# Patient Record
Sex: FEMALE | Race: BLACK OR AFRICAN AMERICAN | NOT HISPANIC OR LATINO | Employment: FULL TIME | URBAN - METROPOLITAN AREA
[De-identification: names, ages, dates, MRNs, and addresses within clinical notes are randomized per-mention and may not be internally consistent; named-entity substitution may affect disease eponyms.]

---

## 2017-06-05 ENCOUNTER — OFFICE VISIT (OUTPATIENT)
Dept: URGENT CARE | Facility: PHYSICIAN GROUP | Age: 42
End: 2017-06-05
Payer: COMMERCIAL

## 2017-06-05 VITALS
DIASTOLIC BLOOD PRESSURE: 100 MMHG | HEART RATE: 98 BPM | SYSTOLIC BLOOD PRESSURE: 142 MMHG | OXYGEN SATURATION: 98 % | RESPIRATION RATE: 20 BRPM | WEIGHT: 190 LBS | TEMPERATURE: 99.1 F

## 2017-06-05 DIAGNOSIS — R21 RASH AND NONSPECIFIC SKIN ERUPTION: ICD-10-CM

## 2017-06-05 DIAGNOSIS — R03.0 ELEVATED BLOOD PRESSURE READING: ICD-10-CM

## 2017-06-05 DIAGNOSIS — L53.9 ERYTHRODERMA: ICD-10-CM

## 2017-06-05 PROCEDURE — 99203 OFFICE O/P NEW LOW 30 MIN: CPT | Performed by: FAMILY MEDICINE

## 2017-06-05 RX ORDER — DEXAMETHASONE SODIUM PHOSPHATE 4 MG/ML
8 INJECTION, SOLUTION INTRA-ARTICULAR; INTRALESIONAL; INTRAMUSCULAR; INTRAVENOUS; SOFT TISSUE ONCE
Status: COMPLETED | OUTPATIENT
Start: 2017-06-05 | End: 2017-06-05

## 2017-06-05 RX ORDER — CEPHALEXIN 500 MG/1
500 CAPSULE ORAL 3 TIMES DAILY
Qty: 21 CAP | Refills: 0 | Status: SHIPPED | OUTPATIENT
Start: 2017-06-05 | End: 2017-06-12

## 2017-06-05 RX ORDER — FLUTICASONE PROPIONATE 50 MCG
1 SPRAY, SUSPENSION (ML) NASAL DAILY
COMMUNITY

## 2017-06-05 RX ORDER — PREDNISONE 20 MG/1
TABLET ORAL
Qty: 21 TAB | Refills: 0 | Status: SHIPPED | OUTPATIENT
Start: 2017-06-05 | End: 2019-06-30 | Stop reason: SDUPTHER

## 2017-06-05 RX ORDER — MONTELUKAST SODIUM 10 MG/1
10 TABLET ORAL DAILY
COMMUNITY
End: 2020-02-07

## 2017-06-05 RX ORDER — CLONIDINE HYDROCHLORIDE 0.1 MG/1
0.1 TABLET ORAL 2 TIMES DAILY
Qty: 60 TAB | Refills: 1 | Status: SHIPPED | OUTPATIENT
Start: 2017-06-05 | End: 2020-02-07

## 2017-06-05 RX ORDER — TRIAMCINOLONE ACETONIDE 1 MG/G
OINTMENT TOPICAL
Qty: 455 G | Refills: 0 | Status: SHIPPED | OUTPATIENT
Start: 2017-06-05 | End: 2020-02-07

## 2017-06-05 RX ADMIN — DEXAMETHASONE SODIUM PHOSPHATE 8 MG: 4 INJECTION, SOLUTION INTRA-ARTICULAR; INTRALESIONAL; INTRAMUSCULAR; INTRAVENOUS; SOFT TISSUE at 09:58

## 2017-06-05 ASSESSMENT — ENCOUNTER SYMPTOMS
DIZZINESS: 0
FEVER: 0
CHILLS: 0
FOCAL WEAKNESS: 0

## 2017-06-05 NOTE — PROGRESS NOTES
Subjective:      Shaunna Hines is a 42 y.o. female who presents with Rash    Chief Complaint   Patient presents with   • Rash     itchy, burning rash over body x3 weeks, worsening        - This is a very pleasant 42 y.o. female with complaints of rash itching on/off x 3 wks. Nothing new she can think of. It is all over.     - No hx HTN, says BP normally runs 120's/70's. She is currently very anxious/tearful due to severe itching/rash all over her body and attributes this to her BP elevation.     - new to area, no pcp.           ALLERGIES:  Review of patient's allergies indicates not on file.     PMH:  No past medical history on file.     MEDS:    Current outpatient prescriptions:   •  fluticasone (FLONASE) 50 MCG/ACT nasal spray, Spray 1 Spray in nose every day., Disp: , Rfl:   •  montelukast (SINGULAIR) 10 MG Tab, Take 10 mg by mouth every day., Disp: , Rfl:   •  predniSONE (DELTASONE) 20 MG Tab, 1 tab TID x 3 days then 1 tab BID x 4 days then 1 tab QD x 4 days, Disp: 21 Tab, Rfl: 0  •  triamcinolone acetonide (KENALOG) 0.1 % Ointment, Apply BID to neck and upper chest x 1-2 weeks, Disp: 455 g, Rfl: 0  •  clonidine (CATAPRES) 0.1 MG Tab, Take 1 Tab by mouth 2 times a day., Disp: 60 Tab, Rfl: 1    Current facility-administered medications:   •  dexamethasone (DECADRON) injection 8 mg, 8 mg, Intramuscular, Once, Usama Britton M.D.    ** I have documented what I find to be significant in regards to past medical, social, family and surgical history  in my HPI or under PMH/PSH/FH review section, otherwise it is contributory **             HPI    Review of Systems   Constitutional: Negative for fever and chills.   Skin: Positive for itching and rash.   Neurological: Negative for dizziness and focal weakness.          Objective:     /100 mmHg  Pulse 98  Temp(Src) 37.3 °C (99.1 °F)  Resp 20  Wt 86.183 kg (190 lb)  SpO2 98%     Physical Exam   Constitutional: She appears well-developed. No distress.   HENT:    Head: Normocephalic and atraumatic.   Eyes: Conjunctivae are normal.   Neck: Neck supple.   Cardiovascular: Regular rhythm.    No murmur heard.  Pulmonary/Chest: Effort normal. No respiratory distress.   Neurological: She is alert. She exhibits normal muscle tone.   Skin: Skin is warm and dry.   Psychiatric: She has a normal mood and affect. Judgment normal.   Nursing note and vitals reviewed.  dry atopic like rash over upper body/neck/face and upper arms mainly. Back of neck has some moist eczematous areas.             Assessment/Plan:         1. Rash and nonspecific skin eruption  triamcinolone acetonide (KENALOG) 0.1 % Ointment    REFERRAL TO FAMILY PRACTICE    dexamethasone (DECADRON) injection 8 mg   2. Erythroderma  REFERRAL TO DERMATOLOGY    predniSONE (DELTASONE) 20 MG Tab    REFERRAL TO FAMILY PRACTICE    dexamethasone (DECADRON) injection 8 mg   3. Elevated blood pressure reading  clonidine (CATAPRES) 0.1 MG Tab    REFERRAL TO FAMILY PRACTICE       Monitor BP closely at home over next week, if BP not improving rtc or w/ pcp       Dx & d/c instructions discussed w/ patient and/or family members. Follow up w/ Prvt Dr or here in 3-4 days if not getting better, sooner if needed,  ER if worse and UC/PCP unavailable.        Possible side effects (i.e. Rash, GI upset/constipation, sedation, elevation of BP or sugars) of any medications given discussed.

## 2017-06-05 NOTE — Clinical Note
June 5, 2017       To Whom it May Concern:    Shaunna Hines was seen in my clinic on 6/5/2017. She may return to work on 6/6/17..    If you have any questions or concerns, please don't hesitate to call.        Sincerely,           Usama Britton M.D.  Electronically Signed

## 2017-06-05 NOTE — MR AVS SNAPSHOT
Shaunna Hines   2017 9:15 AM   Office Visit   MRN: [id number]    Department:  [facility]   Dept Phone:  [phone]    Description:  Female : 1975   Provider:  Usama Britton M.D.           Reason for Visit     Rash itchy, burning rash over body x3 weeks, worsening      Allergies as of 2017     Allergen Noted Reactions    Bactrim [Sulfamethoxazole W-Trimethoprim] 2017       Doxycycline 2017       Nsaids 2017         You were diagnosed with     Rash and nonspecific skin eruption   [303688]       Erythroderma   [[id number]]       Elevated blood pressure reading   [[id number]]         Vital Signs     Blood Pressure Pulse Temperature Respirations Weight Oxygen Saturation    142/100 mmHg 98 37.3 °C (99.1 °F) 20 86.183 kg (190 lb) 98%    Smoking Status                   Unknown If Ever Smoked           Basic Information     Date Of Birth Sex Race Ethnicity Preferred Language    1975 Female Black or  Non- English      Health Maintenance        Date Due Completion Dates    IMM DTaP/Tdap/Td Vaccine (1 - Tdap) 3/21/1995 ---    PAP SMEAR 3/21/1997 ---    MAMMOGRAM 3/21/2016 ---            Current Immunizations     No immunizations on file.      Below and/or attached are the medications your provider expects you to take. Review all of your home medications and newly ordered medications with your provider and/or pharmacist. Follow medication instructions as directed by your provider and/or pharmacist. Please keep your medication list with you and share with your provider. Update the information when medications are discontinued, doses are changed, or new medications (including over-the-counter products) are added; and carry medication information at all times in the event of emergency situations     Allergies:  BACTRIM - (reactions not documented)     DOXYCYCLINE - (reactions not documented)     NSAIDS - (reactions not documented)               Medications   Valid as of: June 05, 2017 - 10:20 AM    Generic Name Brand Name Tablet Size Instructions for use    CloNIDine HCl (Tab) CATAPRES 0.1 MG Take 1 Tab by mouth 2 times a day.        Fluticasone Propionate (Suspension) FLONASE 50 MCG/ACT Spray 1 Spray in nose every day.        Montelukast Sodium (Tab) SINGULAIR 10 MG Take 10 mg by mouth every day.        PredniSONE (Tab) DELTASONE 20 MG 1 tab TID x 3 days then 1 tab BID x 4 days then 1 tab QD x 4 days        Triamcinolone Acetonide (Ointment) KENALOG 0.1 % Apply BID to neck and upper chest x 1-2 weeks        .                 Medicines prescribed today were sent to:     [facility]Y #[id number] - [place], [place] - [address] [place] [address]    Phone: [phone] Fax: [phone]    Open 24 Hours?: No      Medication refill instructions:       If your prescription bottle indicates you have medication refills left, it is not necessary to call your provider’s office. Please contact your pharmacy and they will refill your medication.    If your prescription bottle indicates you do not have any refills left, you may request refills at any time through one of the following ways: The online Social DJ system (except Urgent Care), by calling your provider’s office, or by asking your pharmacy to contact your provider’s office with a refill request. Medication refills are processed only during regular business hours and may not be available until the next business day. Your provider may request additional information or to have a follow-up visit with you prior to refilling your medication.   *Please Note: Medication refills are assigned a new Rx number when refilled electronically. Your pharmacy may indicate that no refills were authorized even though a new prescription for the same medication is available at the pharmacy. Please request the medicine by name with the pharmacy before contacting your provider for a refill.           Referral     A referral request has been sent to our patient care coordination department. Please allow 3-5 business days for us to process this request and contact you either by phone or mail. If you do not hear from us by the 5th business day, please call us at [phone].           ActuatedMedical Access Code: [email or web address]  Expires: 7/5/2017 10:20 AM    ActuatedMedical  A secure, online tool to manage your health information     [employer]’s ActuatedMedical® is a secure, online tool that connects you to your personalized health information from the privacy of your home -- day or night - making it very easy for you to manage your healthcare. Once the activation process is completed, you can even access your medical information using the ActuatedMedical grecia, which is available for free in the Apple Grecia store or Google Play store.     ActuatedMedical provides the following levels of access (as shown below):   My Chart Features   [facility] Primary Care Doctor [facility]  Specialists [facility]  Urgent  Care Non-[facility]  Primary Care  Doctor   Email your healthcare team securely and privately 24/7 X X X    Manage appointments: schedule your next appointment; view details of past/upcoming appointments X      Request prescription refills. X      View recent personal medical records, including lab and immunizations X X X X   View health record, including health history, allergies, medications X X X X   Read reports about your outpatient visits, procedures, consult and ER notes X X X X   See your discharge summary, which is a recap of your hospital and/or ER visit that includes your diagnosis, lab results, and care plan. X X       How to register for ActuatedMedical:  1. Go to  [email or web address].  2. Click on the Sign Up Now box, which takes you to the New Member Sign Up page. You will need to provide the following information:  a. Enter your ActuatedMedical Access Code exactly as it appears at the top of this page. (You will not need to use this code after you’ve  completed the sign-up process. If you do not sign up before the expiration date, you must request a new code.)   b. Enter your date of birth.   c. Enter your home email address.   d. Click Submit, and follow the next screen’s instructions.  3. Create a Issuut ID. This will be your Issuut login ID and cannot be changed, so think of one that is secure and easy to remember.  4. Create a [employer] password. You can change your password at any time.  5. Enter your Password Reset Question and Answer. This can be used at a later time if you forget your password.   6. Enter your e-mail address. This allows you to receive e-mail notifications when new information is available in [employer].  7. Click Sign Up. You can now view your health information.    For assistance activating your [employer] account, call [phone]

## 2017-06-07 ENCOUNTER — TELEPHONE (OUTPATIENT)
Dept: URGENT CARE | Facility: PHYSICIAN GROUP | Age: 42
End: 2017-06-07

## 2017-06-07 DIAGNOSIS — R21 RASH AND NONSPECIFIC SKIN ERUPTION: ICD-10-CM

## 2017-06-07 NOTE — TELEPHONE ENCOUNTER
The Pt Shaunna phoned stating that she has appointments with PCP on 6/20/17 but would like to have labs draw for auto immune issues now, prior to her PCP visit

## 2017-06-08 NOTE — TELEPHONE ENCOUNTER
Kindly call and let patient know I have placed order for basic labs and a couple basic autoimmune labs.     Autoimmune labs tend to be very non-specific, tend to have lots of false-positives and tend to just open a can of worms and cause more questions then answers. If any of the labs I ordered are abnormal her new pcp can order more in depth autoimmune labs if needed.     PS: I am on vacation and will not be back until middle of next week

## 2017-06-09 ENCOUNTER — HOSPITAL ENCOUNTER (OUTPATIENT)
Dept: LAB | Facility: MEDICAL CENTER | Age: 42
End: 2017-06-09
Attending: FAMILY MEDICINE
Payer: COMMERCIAL

## 2017-06-09 DIAGNOSIS — R21 RASH AND NONSPECIFIC SKIN ERUPTION: ICD-10-CM

## 2017-06-09 LAB
ALBUMIN SERPL BCP-MCNC: 4.3 G/DL (ref 3.2–4.9)
ALBUMIN/GLOB SERPL: 1.2 G/DL
ALP SERPL-CCNC: 45 U/L (ref 30–99)
ALT SERPL-CCNC: 38 U/L (ref 2–50)
ANION GAP SERPL CALC-SCNC: 8 MMOL/L (ref 0–11.9)
AST SERPL-CCNC: 22 U/L (ref 12–45)
BASOPHILS # BLD AUTO: 0.2 % (ref 0–1.8)
BASOPHILS # BLD: 0.02 K/UL (ref 0–0.12)
BILIRUB SERPL-MCNC: 0.3 MG/DL (ref 0.1–1.5)
BUN SERPL-MCNC: 15 MG/DL (ref 8–22)
CALCIUM SERPL-MCNC: 9.6 MG/DL (ref 8.5–10.5)
CHLORIDE SERPL-SCNC: 100 MMOL/L (ref 96–112)
CO2 SERPL-SCNC: 28 MMOL/L (ref 20–33)
CREAT SERPL-MCNC: 0.63 MG/DL (ref 0.5–1.4)
EOSINOPHIL # BLD AUTO: 0.01 K/UL (ref 0–0.51)
EOSINOPHIL NFR BLD: 0.1 % (ref 0–6.9)
ERYTHROCYTE [DISTWIDTH] IN BLOOD BY AUTOMATED COUNT: 45.7 FL (ref 35.9–50)
ERYTHROCYTE [SEDIMENTATION RATE] IN BLOOD BY WESTERGREN METHOD: 15 MM/HOUR (ref 0–20)
GFR SERPL CREATININE-BSD FRML MDRD: >60 ML/MIN/1.73 M 2
GLOBULIN SER CALC-MCNC: 3.5 G/DL (ref 1.9–3.5)
GLUCOSE SERPL-MCNC: 98 MG/DL (ref 65–99)
HCT VFR BLD AUTO: 37.1 % (ref 37–47)
HGB BLD-MCNC: 12.4 G/DL (ref 12–16)
IMM GRANULOCYTES # BLD AUTO: 0.07 K/UL (ref 0–0.11)
IMM GRANULOCYTES NFR BLD AUTO: 0.7 % (ref 0–0.9)
LYMPHOCYTES # BLD AUTO: 2.29 K/UL (ref 1–4.8)
LYMPHOCYTES NFR BLD: 22.5 % (ref 22–41)
MCH RBC QN AUTO: 28.1 PG (ref 27–33)
MCHC RBC AUTO-ENTMCNC: 33.4 G/DL (ref 33.6–35)
MCV RBC AUTO: 83.9 FL (ref 81.4–97.8)
MONOCYTES # BLD AUTO: 0.72 K/UL (ref 0–0.85)
MONOCYTES NFR BLD AUTO: 7.1 % (ref 0–13.4)
NEUTROPHILS # BLD AUTO: 7.06 K/UL (ref 2–7.15)
NEUTROPHILS NFR BLD: 69.4 % (ref 44–72)
NRBC # BLD AUTO: 0 K/UL
NRBC BLD AUTO-RTO: 0 /100 WBC
PLATELET # BLD AUTO: 422 K/UL (ref 164–446)
PMV BLD AUTO: 12.4 FL (ref 9–12.9)
POTASSIUM SERPL-SCNC: 3.8 MMOL/L (ref 3.6–5.5)
PROT SERPL-MCNC: 7.8 G/DL (ref 6–8.2)
RBC # BLD AUTO: 4.42 M/UL (ref 4.2–5.4)
SODIUM SERPL-SCNC: 136 MMOL/L (ref 135–145)
TSH SERPL DL<=0.005 MIU/L-ACNC: 0.59 UIU/ML (ref 0.3–3.7)
WBC # BLD AUTO: 10.2 K/UL (ref 4.8–10.8)

## 2017-06-09 PROCEDURE — 85025 COMPLETE CBC W/AUTO DIFF WBC: CPT

## 2017-06-09 PROCEDURE — 80053 COMPREHEN METABOLIC PANEL: CPT

## 2017-06-09 PROCEDURE — 84443 ASSAY THYROID STIM HORMONE: CPT

## 2017-06-09 PROCEDURE — 85652 RBC SED RATE AUTOMATED: CPT

## 2017-06-09 PROCEDURE — 36415 COLL VENOUS BLD VENIPUNCTURE: CPT

## 2017-06-09 NOTE — TELEPHONE ENCOUNTER
Called and informed patient about the orders for her blood work. Advised her to follow up with PCP after getting her labs done.

## 2017-06-14 ENCOUNTER — TELEPHONE (OUTPATIENT)
Dept: URGENT CARE | Facility: CLINIC | Age: 42
End: 2017-06-14

## 2017-06-20 ENCOUNTER — OFFICE VISIT (OUTPATIENT)
Dept: MEDICAL GROUP | Facility: PHYSICIAN GROUP | Age: 42
End: 2017-06-20
Payer: COMMERCIAL

## 2017-06-20 VITALS
TEMPERATURE: 98.4 F | SYSTOLIC BLOOD PRESSURE: 134 MMHG | WEIGHT: 187 LBS | BODY MASS INDEX: 28.34 KG/M2 | DIASTOLIC BLOOD PRESSURE: 78 MMHG | HEIGHT: 68 IN | OXYGEN SATURATION: 97 % | HEART RATE: 78 BPM | RESPIRATION RATE: 14 BRPM

## 2017-06-20 DIAGNOSIS — Z12.31 ENCOUNTER FOR SCREENING MAMMOGRAM FOR BREAST CANCER: ICD-10-CM

## 2017-06-20 DIAGNOSIS — F33.40 RECURRENT MAJOR DEPRESSIVE DISORDER, IN REMISSION (HCC): ICD-10-CM

## 2017-06-20 DIAGNOSIS — Z91.09 ENVIRONMENTAL ALLERGIES: ICD-10-CM

## 2017-06-20 DIAGNOSIS — Z01.419 ENCOUNTER FOR GYNECOLOGICAL EXAMINATION: ICD-10-CM

## 2017-06-20 PROCEDURE — 99204 OFFICE O/P NEW MOD 45 MIN: CPT | Performed by: INTERNAL MEDICINE

## 2017-06-20 RX ORDER — ESCITALOPRAM OXALATE 20 MG/1
20 TABLET ORAL DAILY
COMMUNITY
End: 2020-02-07

## 2017-06-20 ASSESSMENT — PATIENT HEALTH QUESTIONNAIRE - PHQ9: CLINICAL INTERPRETATION OF PHQ2 SCORE: 2

## 2017-06-20 NOTE — ASSESSMENT & PLAN NOTE
Pt has chronic allergies and was previously seen by ENT in [place] where she moved from. She is on flonase and montelukast for this. She is also taking OTC claritin. She has adequate control of symptoms with the medications.     She is pending allergy appointment with Dr. Cassidy later this month. In the past she did allergy shots but stopped them when she got pregnant.

## 2017-06-20 NOTE — PROGRESS NOTES
Chief Complaint   Patient presents with   • Establish Care         HISTORY OF THE PRESENT ILLNESS: Patient is a 42 y.o. female. This pleasant patient is here today environmental allergies, depression      Environmental allergies  Pt has chronic allergies and was previously seen by ENT in [place] where she moved from. She is on flonase and montelukast for this. She is also taking OTC claritin. She has adequate control of symptoms with the medications.     She is pending allergy appointment with Dr. Cassidy later this month. In the past she did allergy shots but stopped them when she got pregnant.     Recurrent major depressive disorder, in remission (CMS-[facility])  Pt reports chronic depression for which she is on lexapro 20 mg daily. She had been on the medication for about a year but stopped when she moved to [place] last November.     Denies symptoms of poor sleep, appetite, energy, or suicidal ideation.       Allergies: Bactrim; Doxycycline; and Nsaids    Current Outpatient Prescriptions Ordered in [facility]   Medication Sig Dispense Refill   • escitalopram (LEXAPRO) 20 MG tablet Take 20 mg by mouth every day.     • fluticasone (FLONASE) 50 MCG/ACT nasal spray Spray 1 Spray in nose every day.     • montelukast (SINGULAIR) 10 MG Tab Take 10 mg by mouth every day.     • triamcinolone acetonide (KENALOG) 0.1 % Ointment Apply BID to neck and upper chest x 1-2 weeks 455 g 0   • predniSONE (DELTASONE) 20 MG Tab 1 tab TID x 3 days then 1 tab BID x 4 days then 1 tab QD x 4 days 21 Tab 0   • clonidine (CATAPRES) 0.1 MG Tab Take 1 Tab by mouth 2 times a day. 60 Tab 1     No current Epic-ordered facility-administered medications on file.       Past Medical History   Diagnosis Date   • Allergy        Past Surgical History   Procedure Laterality Date   • Primary c section     • Abdominal hysterectomy total       partial   • Nasal polypectomy         Social History   Substance Use Topics   • Smoking status: Never Smoker    • Smokeless  "tobacco: Never Used   • Alcohol Use: Yes      Comment: social       Family History   Problem Relation Age of Onset   • Cancer Maternal Grandmother    • Heart Disease Maternal Grandmother    • Diabetes Maternal Grandmother        Review of Systems :     All other systems reviewed and are negative except as in HPI.    Exam: Blood pressure 134/78, pulse 78, temperature 36.9 °C (98.4 °F), resp. rate 14, height 1.715 m (5' 7.5\"), weight 84.823 kg (187 lb), SpO2 97 %.    Blood pressure 134/78, pulse 78, temperature 36.9 °C (98.4 °F), resp. rate 14, height 1.715 m (5' 7.5\"), weight 84.823 kg (187 lb), SpO2 97 %. Body mass index is 28.84 kg/(m^2).   Physical Exam:  Constitutional: Alert & oriented, no acute distress  Eye: Equal, round and reactive, conjunctiva clear, lids normal  ENMT: Lips without lesions, good dentition, oropharynx clear  Neck: Trachea midline, no masses, no thyromegaly. No cervical or supraclavicular lymphadenopathy  Respiratory: Unlabored respiratory effort, lungs clear to auscultation, no wheezes, no ronchi  Cardiovascular: Normal S1, S2, no murmur, no edema  Abdomen: Soft, no tenderness or rebound, no hepatosplenomegaly  Skin: Warm, dry, good turgor, no rashes in visible areas  Neuro: No overt focal neurologic deficits, normal gait  Psych: Normal mood and affect, judgement normal  Musculoskeletal: Normal gait. No extremity cyanosis, clubbing, or edema.    Assessment/Plan  1. Environmental allergies  Continue current medications and proceed with referral to allergist     2. Recurrent major depressive disorder, in remission (CMS-HCC)  well controlled on Lexapro. Continue current medication. No suicidal ideation    3. Encounter for gynecological examination  - REFERRAL TO OB/GYN    4. Encounter for screening mammogram for breast cancer  - [id number]; Future      Return in about 1 year (around 6/20/2018).    Please note that this dictation was created using voice recognition software. I have " made every reasonable attempt to correct obvious errors, but I expect that there are errors of grammar and possibly content that I did not discover before finalizing the note.

## 2017-06-20 NOTE — ASSESSMENT & PLAN NOTE
Pt reports chronic depression for which she is on lexapro 20 mg daily. She had been on the medication for about a year but stopped when she moved to [place] last November.     Denies symptoms of poor sleep, appetite, energy, or suicidal ideation.

## 2017-07-31 ENCOUNTER — RX ONLY (OUTPATIENT)
Age: 42
Setting detail: RX ONLY
End: 2017-07-31

## 2017-07-31 PROBLEM — L50.9 URTICARIA, UNSPECIFIED: Status: ACTIVE | Noted: 2017-07-31

## 2017-11-10 ENCOUNTER — GYNECOLOGY VISIT (OUTPATIENT)
Dept: OBGYN | Facility: CLINIC | Age: 42
End: 2017-11-10
Payer: COMMERCIAL

## 2017-11-10 ENCOUNTER — HOSPITAL ENCOUNTER (OUTPATIENT)
Facility: MEDICAL CENTER | Age: 42
End: 2017-11-10
Attending: OBSTETRICS & GYNECOLOGY
Payer: COMMERCIAL

## 2017-11-10 VITALS
HEIGHT: 68 IN | BODY MASS INDEX: 28.49 KG/M2 | DIASTOLIC BLOOD PRESSURE: 62 MMHG | SYSTOLIC BLOOD PRESSURE: 112 MMHG | WEIGHT: 188 LBS

## 2017-11-10 DIAGNOSIS — Z01.419 WELL WOMAN EXAM WITH ROUTINE GYNECOLOGICAL EXAM: ICD-10-CM

## 2017-11-10 DIAGNOSIS — Z11.51 SPECIAL SCREENING EXAMINATION FOR HUMAN PAPILLOMAVIRUS (HPV): ICD-10-CM

## 2017-11-10 DIAGNOSIS — Z12.31 ENCOUNTER FOR SCREENING MAMMOGRAM FOR BREAST CANCER: ICD-10-CM

## 2017-11-10 PROCEDURE — 87624 HPV HI-RISK TYP POOLED RSLT: CPT

## 2017-11-10 PROCEDURE — 99386 PREV VISIT NEW AGE 40-64: CPT | Performed by: OBSTETRICS & GYNECOLOGY

## 2017-11-10 PROCEDURE — 88175 CYTOPATH C/V AUTO FLUID REDO: CPT

## 2017-11-10 NOTE — PROGRESS NOTES
"Shaunna Hines42 y.o.  female presents for Annual Well Woman Exam.    ROS: Patient is feeling well. No dyspnea or chest pain on exertion. No Abdominal pain, change in bowel habits, black or bloody stools. No urinary sx. GYN ROS:no breast pain or new or enlarging lumps on self exam, no vaginal bleeding, no discharge or pelvic pain. Denies breast tenderness, mass, discharge, changes in size or contour, or abnormal cyclic discomfort. No neurological complaints.  Past Medical History:   Diagnosis Date   • Allergy    • Anemia    • Migraine      Prior Uterine Surgery TLH/ ???? BS , 2 yrs ago   Allergy:   Bactrim [sulfamethoxazole w-trimethoprim]; Doxycycline; and Nsaids  Ablation , failed , then needed TLH .   LMP:       No LMP recorded. .     Menstrual History: no menses yet, s/p hysterectomy  Contraceptive Method:status post hysterectomy      Objective : The patient appears well, alert and oriented x 3, in no acute distress.  Blood pressure 112/62, height 1.715 m (5' 7.5\"), weight 85.3 kg (188 lb).  HEENT Exam: EOMI, DELL, no adenopathy or thyromegaly.  Lungs: Clear to Auscultation and Percussion.  Cor: S1 and S2 normal, no murmurs, or rubs   Abdomen: Soft without tenderness, guarding mass or organomegaly.  Extremities: No edema, pulses equal  Neurological: Normal No focal signs  Breast Exam:Inspection negative. No nipple discharge or bleeding. No masses or nodularity palpable, negative findings: normal in size and symmetry, normal contour with no evidence of flattening or dimpling, skin normal, nipples everted without rashes or discharge  Pelvic: External genitalia,urethral meatus, urethra, bladder and vagina normal. Cervix, uterus and adnexa absent.  Anus and perineum normal. Bimanual and rectovaginal without masses or tenderness.    Lab:No results found for this or any previous visit (from the past 336 hour(s)).    Assessment:  well woman    Plan:mammogram  pap smear  return annually or prn  Self Breast " Exams  Contraception:hysterectomy

## 2017-11-11 LAB
CYTOLOGY REG CYTOL: NORMAL
HPV HR 12 DNA CVX QL NAA+PROBE: NEGATIVE
HPV16 DNA SPEC QL NAA+PROBE: NEGATIVE
HPV18 DNA SPEC QL NAA+PROBE: NEGATIVE
SPECIMEN SOURCE: NORMAL

## 2017-11-14 ENCOUNTER — HOSPITAL ENCOUNTER (OUTPATIENT)
Dept: RADIOLOGY | Facility: MEDICAL CENTER | Age: 42
End: 2017-11-14
Attending: INTERNAL MEDICINE
Payer: COMMERCIAL

## 2017-11-14 DIAGNOSIS — Z12.31 ENCOUNTER FOR SCREENING MAMMOGRAM FOR BREAST CANCER: ICD-10-CM

## 2017-11-14 PROCEDURE — G0202 SCR MAMMO BI INCL CAD: HCPCS

## 2017-11-17 ENCOUNTER — HOSPITAL ENCOUNTER (OUTPATIENT)
Dept: RADIOLOGY | Facility: MEDICAL CENTER | Age: 42
End: 2017-11-17

## 2017-11-18 DIAGNOSIS — R92.8 ABNORMAL SCREENING MAMMOGRAM: ICD-10-CM

## 2017-11-20 ENCOUNTER — TELEPHONE (OUTPATIENT)
Dept: MEDICAL GROUP | Facility: PHYSICIAN GROUP | Age: 42
End: 2017-11-20

## 2017-11-20 NOTE — TELEPHONE ENCOUNTER
----- Message from Reginaldo Latif M.D. sent at 11/18/2017  7:50 AM PST -----  Mammogram shows no masses or suspicious calcifications. However, the view of the left breast has what is described as a subtle distortion. It is recommended by the radiologist to get a left breast diagnostic mammogram and possibly an ultrasound as well. I have placed orders for both these imaging studies. Please call patient and inform them. Thank you  - Dr. Latif

## 2017-11-30 ENCOUNTER — HOSPITAL ENCOUNTER (OUTPATIENT)
Dept: RADIOLOGY | Facility: MEDICAL CENTER | Age: 42
End: 2017-11-30
Attending: INTERNAL MEDICINE
Payer: COMMERCIAL

## 2017-11-30 DIAGNOSIS — R92.8 ABNORMAL SCREENING MAMMOGRAM: ICD-10-CM

## 2017-11-30 PROCEDURE — G0206 DX MAMMO INCL CAD UNI: HCPCS | Mod: LT

## 2018-03-31 ENCOUNTER — TELEPHONE (OUTPATIENT)
Dept: URGENT CARE | Facility: PHYSICIAN GROUP | Age: 43
End: 2018-03-31

## 2018-03-31 ENCOUNTER — OFFICE VISIT (OUTPATIENT)
Dept: URGENT CARE | Facility: PHYSICIAN GROUP | Age: 43
End: 2018-03-31
Payer: COMMERCIAL

## 2018-03-31 ENCOUNTER — HOSPITAL ENCOUNTER (OUTPATIENT)
Dept: RADIOLOGY | Facility: MEDICAL CENTER | Age: 43
End: 2018-03-31
Attending: EMERGENCY MEDICINE
Payer: COMMERCIAL

## 2018-03-31 VITALS
OXYGEN SATURATION: 97 % | RESPIRATION RATE: 16 BRPM | SYSTOLIC BLOOD PRESSURE: 116 MMHG | DIASTOLIC BLOOD PRESSURE: 68 MMHG | WEIGHT: 188 LBS | HEART RATE: 77 BPM | HEIGHT: 68 IN | BODY MASS INDEX: 28.49 KG/M2 | TEMPERATURE: 97.7 F

## 2018-03-31 DIAGNOSIS — M54.42 ACUTE LEFT-SIDED LOW BACK PAIN WITH LEFT-SIDED SCIATICA: ICD-10-CM

## 2018-03-31 PROCEDURE — 99214 OFFICE O/P EST MOD 30 MIN: CPT | Performed by: EMERGENCY MEDICINE

## 2018-03-31 PROCEDURE — 72100 X-RAY EXAM L-S SPINE 2/3 VWS: CPT

## 2018-03-31 RX ORDER — FEXOFENADINE HCL 180 MG/1
180 TABLET ORAL DAILY
COMMUNITY
End: 2020-02-07

## 2018-03-31 RX ORDER — METHOCARBAMOL 750 MG/1
1500 TABLET, FILM COATED ORAL 3 TIMES DAILY
Qty: 60 TAB | Refills: 0 | Status: SHIPPED | OUTPATIENT
Start: 2018-03-31 | End: 2020-02-07

## 2018-03-31 ASSESSMENT — ENCOUNTER SYMPTOMS
VOMITING: 0
NERVOUS/ANXIOUS: 0
NAUSEA: 0
FEVER: 0
CHILLS: 0
NECK PAIN: 0
DIZZINESS: 0
PALPITATIONS: 0
TINGLING: 1
ABDOMINAL PAIN: 0
BACK PAIN: 1
COUGH: 0
FALLS: 0
HEADACHES: 0
HEMOPTYSIS: 0

## 2018-03-31 NOTE — PROGRESS NOTES
"Subjective:      Shaunna Hines is a 43 y.o. female who presents with Back Pain (with radiation down L leg)            HPI  Patient is a pleasant 43-year-old female complaining of left-sided low back pain reading down to her foot. Patient had similar symptomatology 13 years ago when she delivered a baby. Since that time she's had low back pain and has unable to have it \"worked out\" by her chiropractor for the past couple weeks her pain has been persistent. She declines any narcotics has no bowel or bladder incontinence.    Allergies   Allergen Reactions   • Bactrim [Sulfamethoxazole W-Trimethoprim]    • Doxycycline    • Nsaids      Social History     Social History   • Marital status:      Spouse name: N/A   • Number of children: N/A   • Years of education: N/A     Occupational History   • Not on file.     Social History Main Topics   • Smoking status: Never Smoker   • Smokeless tobacco: Never Used   • Alcohol use Yes      Comment: social   • Drug use: No   • Sexual activity: Yes     Partners: Male     Birth control/ protection: Surgical     Other Topics Concern   • Not on file     Social History Narrative   • No narrative on file     Past Medical History:   Diagnosis Date   • Allergy    • Anemia    • Migraine      Current Outpatient Prescriptions on File Prior to Visit   Medication Sig Dispense Refill   • fluticasone (FLONASE) 50 MCG/ACT nasal spray Spray 1 Spray in nose every day.     • montelukast (SINGULAIR) 10 MG Tab Take 10 mg by mouth every day.     • escitalopram (LEXAPRO) 20 MG tablet Take 20 mg by mouth every day.     • predniSONE (DELTASONE) 20 MG Tab 1 tab TID x 3 days then 1 tab BID x 4 days then 1 tab QD x 4 days 21 Tab 0   • triamcinolone acetonide (KENALOG) 0.1 % Ointment Apply BID to neck and upper chest x 1-2 weeks 455 g 0   • clonidine (CATAPRES) 0.1 MG Tab Take 1 Tab by mouth 2 times a day. 60 Tab 1     No current facility-administered medications on file prior to visit.      Family History " "  Problem Relation Age of Onset   • Cancer Maternal Grandmother    • Heart Disease Maternal Grandmother    • Diabetes Maternal Grandmother    • Cancer Maternal Grandfather    • Diabetes Paternal Grandmother    • Diabetes Paternal Grandfather      Review of Systems   Constitutional: Negative for chills and fever.   Respiratory: Negative for cough and hemoptysis.    Cardiovascular: Negative for chest pain and palpitations.   Gastrointestinal: Negative for abdominal pain, nausea and vomiting.   Genitourinary: Negative.         Patient has no bowel or bladder incontinence.   Musculoskeletal: Positive for back pain. Negative for falls and neck pain.   Skin: Negative for rash.   Neurological: Positive for tingling (patient feels burning radiating down her left leg to her foot.). Negative for dizziness and headaches.        Patient feels a burning sensation down her left leg to her foot gets intermittent.   Psychiatric/Behavioral: The patient is not nervous/anxious.           Objective:     /68   Pulse 77   Temp 36.5 °C (97.7 °F)   Resp 16   Ht 1.715 m (5' 7.5\")   Wt 85.3 kg (188 lb)   SpO2 97%   BMI 29.01 kg/m²      Physical Exam   Constitutional: She appears well-developed and well-nourished. No distress.   HENT:   Head: Normocephalic and atraumatic.   Right Ear: External ear normal.   Left Ear: External ear normal.   Neck: Normal range of motion.   Cardiovascular: Normal rate and regular rhythm.    Pulmonary/Chest: Effort normal.   Abdominal: She exhibits no distension. There is no tenderness.   Musculoskeletal: Normal range of motion. She exhibits tenderness. She exhibits no edema.   Straight leg raising is 90° bilaterally reflexes are 1+ at the knees 2+ at the ankles dorsiflexion is 5+ over 5. Patient has no rash on her lumbar  over her left SI joint and L4-5 good rectal tone no saddle anesthesia.   Neurological: She is alert.   Skin: Skin is dry. No rash noted. She is not diaphoretic. No " erythema.   Psychiatric: She has a normal mood and affect.   Nursing note and vitals reviewed.         Patient declined narcotics.    Lumbar spine shows L4-5 anterolisthesis with degenerative changes, L5-S1 retrolisthesis  Assessment/Plan:     1. Acute left-sided low back pain with left-sided sciatica    2. Triad asthma      I feel the patient has significant sciatica currently does not have a primary care provider will give her referral to neurosurgery for evaluation she is not currently interested in neurosurgical procedure but may warrant evaluation for steroid injections. Patient aware she needs to return immediately for any bowel or bladder incontinence. Also  L/S spine x-ray  - DX-SPINE L/S BENDING ONLY 2-3 VIEWS; Future

## 2018-07-02 ENCOUNTER — HOSPITAL ENCOUNTER (OUTPATIENT)
Facility: MEDICAL CENTER | Age: 43
End: 2018-07-02
Attending: NEUROLOGICAL SURGERY | Admitting: NEUROLOGICAL SURGERY
Payer: COMMERCIAL

## 2018-08-30 ENCOUNTER — HOSPITAL ENCOUNTER (OUTPATIENT)
Dept: RADIOLOGY | Facility: MEDICAL CENTER | Age: 43
End: 2018-08-30
Attending: NEUROLOGICAL SURGERY
Payer: COMMERCIAL

## 2018-08-30 ENCOUNTER — HOSPITAL ENCOUNTER (OUTPATIENT)
Dept: LAB | Facility: MEDICAL CENTER | Age: 43
End: 2018-08-30
Attending: NEUROLOGICAL SURGERY
Payer: COMMERCIAL

## 2018-08-30 DIAGNOSIS — M51.26 DISPLACEMENT OF LUMBAR INTERVERTEBRAL DISC WITHOUT MYELOPATHY: ICD-10-CM

## 2018-08-30 DIAGNOSIS — Z01.812 PRE-OPERATIVE LABORATORY EXAMINATION: ICD-10-CM

## 2018-08-30 LAB
APTT PPP: 27 SEC (ref 24.7–36)
B-HCG SERPL-ACNC: <0.6 MIU/ML (ref 0–5)
BASOPHILS # BLD AUTO: 0.5 % (ref 0–1.8)
BASOPHILS # BLD: 0.04 K/UL (ref 0–0.12)
EOSINOPHIL # BLD AUTO: 0.01 K/UL (ref 0–0.51)
EOSINOPHIL NFR BLD: 0.1 % (ref 0–6.9)
ERYTHROCYTE [DISTWIDTH] IN BLOOD BY AUTOMATED COUNT: 46 FL (ref 35.9–50)
HCT VFR BLD AUTO: 39.4 % (ref 37–47)
HGB BLD-MCNC: 12.7 G/DL (ref 12–16)
IMM GRANULOCYTES # BLD AUTO: 0.03 K/UL (ref 0–0.11)
IMM GRANULOCYTES NFR BLD AUTO: 0.4 % (ref 0–0.9)
INR PPP: 1.04 (ref 0.87–1.13)
LYMPHOCYTES # BLD AUTO: 2.44 K/UL (ref 1–4.8)
LYMPHOCYTES NFR BLD: 29.6 % (ref 22–41)
MCH RBC QN AUTO: 27.2 PG (ref 27–33)
MCHC RBC AUTO-ENTMCNC: 32.2 G/DL (ref 33.6–35)
MCV RBC AUTO: 84.4 FL (ref 81.4–97.8)
MONOCYTES # BLD AUTO: 0.71 K/UL (ref 0–0.85)
MONOCYTES NFR BLD AUTO: 8.6 % (ref 0–13.4)
NEUTROPHILS # BLD AUTO: 5.02 K/UL (ref 2–7.15)
NEUTROPHILS NFR BLD: 60.8 % (ref 44–72)
NRBC # BLD AUTO: 0 K/UL
NRBC BLD-RTO: 0 /100 WBC
PLATELET # BLD AUTO: 297 K/UL (ref 164–446)
PMV BLD AUTO: 13.7 FL (ref 9–12.9)
PROTHROMBIN TIME: 13.3 SEC (ref 12–14.6)
RBC # BLD AUTO: 4.67 M/UL (ref 4.2–5.4)
WBC # BLD AUTO: 8.3 K/UL (ref 4.8–10.8)

## 2018-08-30 PROCEDURE — 85025 COMPLETE CBC W/AUTO DIFF WBC: CPT

## 2018-08-30 PROCEDURE — 84702 CHORIONIC GONADOTROPIN TEST: CPT

## 2018-08-30 PROCEDURE — 80048 BASIC METABOLIC PNL TOTAL CA: CPT

## 2018-08-30 PROCEDURE — 85730 THROMBOPLASTIN TIME PARTIAL: CPT

## 2018-08-30 PROCEDURE — 85610 PROTHROMBIN TIME: CPT

## 2018-08-30 PROCEDURE — 71046 X-RAY EXAM CHEST 2 VIEWS: CPT

## 2018-08-30 PROCEDURE — 83880 ASSAY OF NATRIURETIC PEPTIDE: CPT

## 2018-08-31 ENCOUNTER — HOSPITAL ENCOUNTER (OUTPATIENT)
Dept: CARDIOLOGY | Facility: MEDICAL CENTER | Age: 43
End: 2018-08-31
Attending: NEUROLOGICAL SURGERY
Payer: COMMERCIAL

## 2018-08-31 ENCOUNTER — HOSPITAL ENCOUNTER (OUTPATIENT)
Dept: RADIOLOGY | Facility: MEDICAL CENTER | Age: 43
End: 2018-08-31

## 2018-08-31 LAB
ANION GAP SERPL CALC-SCNC: 11 MMOL/L (ref 0–11.9)
BNP SERPL-MCNC: 2 PG/ML (ref 0–100)
BUN SERPL-MCNC: 14 MG/DL (ref 8–22)
CALCIUM SERPL-MCNC: 9.7 MG/DL (ref 8.5–10.5)
CHLORIDE SERPL-SCNC: 104 MMOL/L (ref 96–112)
CO2 SERPL-SCNC: 24 MMOL/L (ref 20–33)
CREAT SERPL-MCNC: 0.7 MG/DL (ref 0.5–1.4)
EKG IMPRESSION: NORMAL
GLUCOSE SERPL-MCNC: 100 MG/DL (ref 65–99)
POTASSIUM SERPL-SCNC: 4.1 MMOL/L (ref 3.6–5.5)
SODIUM SERPL-SCNC: 139 MMOL/L (ref 135–145)

## 2018-08-31 PROCEDURE — 93010 ELECTROCARDIOGRAM REPORT: CPT | Performed by: INTERNAL MEDICINE

## 2018-08-31 PROCEDURE — 93005 ELECTROCARDIOGRAM TRACING: CPT | Performed by: NEUROLOGICAL SURGERY

## 2018-12-05 ENCOUNTER — TELEPHONE (OUTPATIENT)
Dept: OBGYN | Facility: CLINIC | Age: 43
End: 2018-12-05

## 2018-12-05 NOTE — TELEPHONE ENCOUNTER
Pt called asking for mammogram order, states she called to set up an annual appt but wasn't able to schedule because shedule is not open yet.  I sent msg to provider to see if he wants to order.    12/7/18 Unable to contact pt, msg left to call back.    Mammogram ordered.

## 2018-12-06 DIAGNOSIS — Z12.39 SCREENING FOR BREAST CANCER: ICD-10-CM

## 2018-12-28 ENCOUNTER — OFFICE VISIT (OUTPATIENT)
Dept: URGENT CARE | Facility: PHYSICIAN GROUP | Age: 43
End: 2018-12-28
Payer: COMMERCIAL

## 2018-12-28 VITALS
HEART RATE: 107 BPM | DIASTOLIC BLOOD PRESSURE: 70 MMHG | WEIGHT: 186 LBS | OXYGEN SATURATION: 97 % | SYSTOLIC BLOOD PRESSURE: 110 MMHG | HEIGHT: 67 IN | RESPIRATION RATE: 20 BRPM | TEMPERATURE: 102.7 F | BODY MASS INDEX: 29.19 KG/M2

## 2018-12-28 DIAGNOSIS — J02.0 STREP PHARYNGITIS: Primary | ICD-10-CM

## 2018-12-28 DIAGNOSIS — J10.1 INFLUENZA B: ICD-10-CM

## 2018-12-28 LAB
FLUAV+FLUBV AG SPEC QL IA: NORMAL
INT CON NEG: NEGATIVE
INT CON NEG: NEGATIVE
INT CON POS: POSITIVE
INT CON POS: POSITIVE
S PYO AG THROAT QL: POSITIVE

## 2018-12-28 PROCEDURE — 87804 INFLUENZA ASSAY W/OPTIC: CPT | Performed by: PHYSICIAN ASSISTANT

## 2018-12-28 PROCEDURE — 87880 STREP A ASSAY W/OPTIC: CPT | Performed by: PHYSICIAN ASSISTANT

## 2018-12-28 PROCEDURE — 99214 OFFICE O/P EST MOD 30 MIN: CPT | Performed by: PHYSICIAN ASSISTANT

## 2018-12-28 RX ORDER — ACETAMINOPHEN 500 MG
500 TABLET ORAL ONCE
Status: COMPLETED | OUTPATIENT
Start: 2018-12-28 | End: 2018-12-28

## 2018-12-28 RX ORDER — AMOXICILLIN 400 MG/5ML
500 POWDER, FOR SUSPENSION ORAL 2 TIMES DAILY
Qty: 126 ML | Refills: 0 | Status: SHIPPED | OUTPATIENT
Start: 2018-12-28 | End: 2019-01-07

## 2018-12-28 RX ORDER — OSELTAMIVIR PHOSPHATE 75 MG/1
75 CAPSULE ORAL 2 TIMES DAILY
Qty: 10 CAP | Refills: 0 | Status: SHIPPED | OUTPATIENT
Start: 2018-12-28 | End: 2019-01-02

## 2018-12-28 RX ADMIN — Medication 500 MG: at 09:54

## 2018-12-28 ASSESSMENT — ENCOUNTER SYMPTOMS
SPUTUM PRODUCTION: 0
MYALGIAS: 1
FOCAL WEAKNESS: 0
DIARRHEA: 0
NAUSEA: 1
SORE THROAT: 1
ABDOMINAL PAIN: 0
FEVER: 1
VOMITING: 0
SENSORY CHANGE: 0
HEADACHES: 1
SHORTNESS OF BREATH: 0
TINGLING: 0
WHEEZING: 0
COUGH: 0
NECK PAIN: 0
CHILLS: 1
PALPITATIONS: 0
SINUS PAIN: 0
SWOLLEN GLANDS: 1

## 2018-12-28 NOTE — LETTER
December 28, 2018         Patient: Shaunna Hines   YOB: 1975   Date of Visit: 12/28/2018           To Whom it May Concern:    Shaunna Hines was seen in my clinic on 12/28/2018. She is excused from work from 12/28/18-1/3/19 due to medical reasons.  .    If you have any questions or concerns, please don't hesitate to call.        Sincerely,           Yue Elam P.A.-C.  Electronically Signed

## 2018-12-28 NOTE — PROGRESS NOTES
Subjective:      Shaunna Hines is a 43 y.o. female who presents with Pharyngitis (sore throat, body aches, headace, ear pain, x 2days)            Pharyngitis    This is a new problem. Episode onset: 2 days  The problem has been unchanged. The maximum temperature recorded prior to her arrival was 102 - 102.9 F. Associated symptoms include congestion, ear pain, headaches and swollen glands. Pertinent negatives include no abdominal pain, coughing, diarrhea, ear discharge, plugged ear sensation, neck pain, shortness of breath or vomiting. Associated symptoms comments: Sore throat . Treatments tried: Daqyuil, Theraflu,  The treatment provided mild relief.     Past Medical History:   Diagnosis Date   • Allergy    • Anemia    • Migraine        Past Surgical History:   Procedure Laterality Date   • ABDOMINAL HYSTERECTOMY TOTAL      partial   • NASAL POLYPECTOMY     • PRIMARY C SECTION       Family History   Problem Relation Age of Onset   • Cancer Maternal Grandmother    • Heart Disease Maternal Grandmother    • Diabetes Maternal Grandmother    • Cancer Maternal Grandfather    • Diabetes Paternal Grandmother    • Diabetes Paternal Grandfather        Allergies   Allergen Reactions   • Norco [Hydrocodone-Acetaminophen] Itching   • Bactrim [Sulfamethoxazole W-Trimethoprim]    • Doxycycline    • Nsaids        Medications, Allergies, and current problem list reviewed today in Epic      Review of Systems   Constitutional: Positive for chills, fever and malaise/fatigue.   HENT: Positive for congestion, ear pain and sore throat. Negative for ear discharge and sinus pain.    Respiratory: Negative for cough, sputum production, shortness of breath and wheezing.    Cardiovascular: Negative for chest pain, palpitations and leg swelling.   Gastrointestinal: Positive for nausea. Negative for abdominal pain, diarrhea and vomiting.   Musculoskeletal: Positive for myalgias. Negative for neck pain.   Skin: Negative for rash.  "  Neurological: Positive for headaches. Negative for tingling, sensory change and focal weakness.     All other systems reviewed and are negative.        Objective:     /70 (BP Location: Left arm, Patient Position: Sitting, BP Cuff Size: Adult)   Pulse (!) 107   Temp (!) 39.3 °C (102.7 °F) (Temporal)   Resp 20   Ht 1.702 m (5' 7\")   Wt 84.4 kg (186 lb)   SpO2 97%   Breastfeeding? No   BMI 29.13 kg/m²      Physical Exam   Constitutional: She is oriented to person, place, and time. She appears well-developed and well-nourished.   Ill appearing    HENT:   Head: Normocephalic and atraumatic.   Right Ear: Tympanic membrane, external ear and ear canal normal.   Left Ear: Tympanic membrane, external ear and ear canal normal.   Nose: Mucosal edema and rhinorrhea present.   Mouth/Throat: Uvula is midline. Posterior oropharyngeal erythema present. No tonsillar exudate.   Eyes: Conjunctivae are normal.   Neck: Neck supple.   Cardiovascular: Normal rate, regular rhythm and normal heart sounds.  Exam reveals no gallop and no friction rub.    No murmur heard.  Pulmonary/Chest: Effort normal and breath sounds normal. No respiratory distress. She has no decreased breath sounds. She has no wheezes. She has no rhonchi. She has no rales.   Lymphadenopathy:     She has no cervical adenopathy.   Neurological: She is alert and oriented to person, place, and time. No cranial nerve deficit.   Skin: Skin is warm and dry. No rash noted.   Psychiatric: She has a normal mood and affect. Her behavior is normal. Judgment and thought content normal.               Assessment/Plan:     1. Strep pharyngitis  POCT Influenza A/B- Positive B    POCT Rapid Strep A- positive     acetaminophen (TYLENOL) tablet 500 mg    amoxicillin (AMOXIL) 400 MG/5ML suspension   2. Influenza B  oseltamivir (TAMIFLU) 75 MG Cap       •  amoxicillin (AMOXIL) 400 MG/5ML suspension, Take 6.3 mL by mouth 2 times a day for 10 days., Disp: 126 mL, Rfl: 0    •  " oseltamivir (TAMIFLU) 75 MG Cap, Take 1 Cap by mouth 2 times a day for 5 days., Disp: 10 Cap, Rfl: 0      Current Facility-Administered Medications:   •  acetaminophen (TYLENOL) tablet 500 mg, 500 mg, Oral, Once, Yue Elam P.A.-C.     Differential diagnoses, Supportive care, and indications for immediate follow-up discussed with patient.   Instructed to return to clinic or nearest emergency department for any change in condition, further concerns, or worsening of symptoms.    The patient demonstrated a good understanding and agreed with the treatment plan.    Yue Elam P.A.-C.

## 2019-02-06 ENCOUNTER — HOSPITAL ENCOUNTER (OUTPATIENT)
Dept: RADIOLOGY | Facility: MEDICAL CENTER | Age: 44
End: 2019-02-06
Attending: OBSTETRICS & GYNECOLOGY
Payer: COMMERCIAL

## 2019-02-06 DIAGNOSIS — Z12.39 SCREENING FOR BREAST CANCER: ICD-10-CM

## 2019-02-06 PROCEDURE — 77067 SCR MAMMO BI INCL CAD: CPT

## 2019-03-05 ENCOUNTER — HOSPITAL ENCOUNTER (OUTPATIENT)
Facility: MEDICAL CENTER | Age: 44
End: 2019-03-05
Attending: OBSTETRICS & GYNECOLOGY
Payer: COMMERCIAL

## 2019-03-05 ENCOUNTER — GYNECOLOGY VISIT (OUTPATIENT)
Dept: OBGYN | Facility: CLINIC | Age: 44
End: 2019-03-05
Payer: COMMERCIAL

## 2019-03-05 VITALS — BODY MASS INDEX: 29.13 KG/M2 | DIASTOLIC BLOOD PRESSURE: 76 MMHG | SYSTOLIC BLOOD PRESSURE: 110 MMHG | WEIGHT: 186 LBS

## 2019-03-05 DIAGNOSIS — Z01.419 WELL WOMAN EXAM: ICD-10-CM

## 2019-03-05 DIAGNOSIS — Z12.39 SCREENING FOR BREAST CANCER: ICD-10-CM

## 2019-03-05 DIAGNOSIS — Z11.51 SCREENING FOR HPV (HUMAN PAPILLOMAVIRUS): ICD-10-CM

## 2019-03-05 DIAGNOSIS — Z12.4 SCREENING FOR MALIGNANT NEOPLASM OF CERVIX: ICD-10-CM

## 2019-03-05 PROCEDURE — 88175 CYTOPATH C/V AUTO FLUID REDO: CPT

## 2019-03-05 PROCEDURE — 87491 CHLMYD TRACH DNA AMP PROBE: CPT

## 2019-03-05 PROCEDURE — 99396 PREV VISIT EST AGE 40-64: CPT | Performed by: OBSTETRICS & GYNECOLOGY

## 2019-03-05 PROCEDURE — 87624 HPV HI-RISK TYP POOLED RSLT: CPT

## 2019-03-05 PROCEDURE — 87591 N.GONORRHOEAE DNA AMP PROB: CPT

## 2019-03-05 NOTE — PROGRESS NOTES
Shaunna Hines43 y.o.  female presents for Annual Well Woman Exam.    ROS: Patient is feeling well. No dyspnea or chest pain on exertion. No Abdominal pain, change in bowel habits, black or bloody stools. No urinary sx. GYN ROS:no breast pain or new or enlarging lumps on self exam, no vaginal bleeding, no discharge or pelvic pain. Denies breast tenderness, mass, discharge, changes in size or contour, or abnormal cyclic discomfort., Negative breast symptoms: tenderness, pain, mass, cyst, discharge No neurological complaints.  Past Medical History:   Diagnosis Date   • Allergy    • Anemia    • Migraine      Prior Uterine Surgery  Allergy:   Norco [hydrocodone-acetaminophen]; Bactrim [sulfamethoxazole w-trimethoprim]; Doxycycline; Methocarbamol; and Nsaids    LMP:       No LMP recorded. Patient has had a hysterectomy. .     Menstrual History: s/p hysterectomy  Contraceptive Method:none      Objective : The patient appears well, alert and oriented x 3, in no acute distress.  Blood pressure 110/76, weight 84.4 kg (186 lb), not currently breastfeeding.  HEENT Exam: EOMI, DELL, no adenopathy or thyromegaly.  Lungs: Clear to Auscultation and Percussion.  Cor: S1 and S2 normal, no murmurs, or rubs   Abdomen: Soft without tenderness, guarding mass or organomegaly.  Extremities: No edema, pulses equal  Neurological: Normal No focal signs  Breast Exam:Inspection negative. No nipple discharge or bleeding. No masses or nodularity palpable  Pelvic: External genitalia,urethral meatus, urethra, bladder and vagina normal. Cervix, uterus and adnexa absent.  Anus and perineum normal. Bimanual and rectovaginal without masses or tenderness.    Lab:No results found for this or any previous visit (from the past 336 hour(s)).    Assessment:  well woman    Plan:mammogram  Done   pap smear  return annually or prn  Self Breast Exams  Contraception:none

## 2019-03-06 DIAGNOSIS — Z01.419 WELL WOMAN EXAM: ICD-10-CM

## 2019-03-06 DIAGNOSIS — Z12.39 SCREENING FOR BREAST CANCER: ICD-10-CM

## 2019-03-06 DIAGNOSIS — Z11.51 SCREENING FOR HPV (HUMAN PAPILLOMAVIRUS): ICD-10-CM

## 2019-03-06 DIAGNOSIS — Z12.4 SCREENING FOR MALIGNANT NEOPLASM OF CERVIX: ICD-10-CM

## 2019-03-06 LAB
C TRACH DNA GENITAL QL NAA+PROBE: NEGATIVE
CYTOLOGY REG CYTOL: NORMAL
HPV HR 12 DNA CVX QL NAA+PROBE: NEGATIVE
HPV16 DNA SPEC QL NAA+PROBE: NEGATIVE
HPV18 DNA SPEC QL NAA+PROBE: NEGATIVE
N GONORRHOEA DNA GENITAL QL NAA+PROBE: NEGATIVE
SPECIMEN SOURCE: NORMAL
SPECIMEN SOURCE: NORMAL

## 2019-06-30 ENCOUNTER — OFFICE VISIT (OUTPATIENT)
Dept: URGENT CARE | Facility: PHYSICIAN GROUP | Age: 44
End: 2019-06-30
Payer: COMMERCIAL

## 2019-06-30 VITALS
OXYGEN SATURATION: 98 % | TEMPERATURE: 98.2 F | DIASTOLIC BLOOD PRESSURE: 70 MMHG | WEIGHT: 186 LBS | HEART RATE: 88 BPM | BODY MASS INDEX: 29.19 KG/M2 | SYSTOLIC BLOOD PRESSURE: 122 MMHG | HEIGHT: 67 IN

## 2019-06-30 DIAGNOSIS — L50.9 HIVES: ICD-10-CM

## 2019-06-30 PROCEDURE — 99214 OFFICE O/P EST MOD 30 MIN: CPT | Performed by: PHYSICIAN ASSISTANT

## 2019-06-30 RX ORDER — TRIAMCINOLONE ACETONIDE 1 MG/G
CREAM TOPICAL
Qty: 1 TUBE | Refills: 0 | Status: SHIPPED | OUTPATIENT
Start: 2019-06-30 | End: 2020-02-07

## 2019-06-30 RX ORDER — PREDNISONE 20 MG/1
TABLET ORAL
Qty: 21 TAB | Refills: 0 | Status: SHIPPED | OUTPATIENT
Start: 2019-06-30 | End: 2020-02-07

## 2019-06-30 RX ORDER — METHYLPREDNISOLONE SODIUM SUCCINATE 125 MG/2ML
125 INJECTION, POWDER, LYOPHILIZED, FOR SOLUTION INTRAMUSCULAR; INTRAVENOUS ONCE
Status: COMPLETED | OUTPATIENT
Start: 2019-06-30 | End: 2019-06-30

## 2019-06-30 RX ADMIN — METHYLPREDNISOLONE SODIUM SUCCINATE 125 MG: 125 INJECTION, POWDER, LYOPHILIZED, FOR SOLUTION INTRAMUSCULAR; INTRAVENOUS at 13:58

## 2019-06-30 ASSESSMENT — ENCOUNTER SYMPTOMS
SHORTNESS OF BREATH: 0
STRIDOR: 0
COUGH: 0
SENSORY CHANGE: 0
TINGLING: 0
NAUSEA: 0
ABDOMINAL PAIN: 0
FEVER: 0
VOMITING: 0
SORE THROAT: 0
SPUTUM PRODUCTION: 0
CHILLS: 0
WHEEZING: 0

## 2019-06-30 NOTE — PROGRESS NOTES
Subjective:   Shaunna Hines is a 44 y.o. female who presents for Rash (itching, all over x 15 days)        Rash   This is a new problem. The current episode started 1 to 4 weeks ago. Pertinent negatives include no cough, fever, shortness of breath, sore throat or vomiting.     Patient notes waxing and waning last 2 weeks of dramatic itching all over body.  She notes past medical history of similar about 2 years ago.  At that time she is new to the [facility] and was thought to be associated with local environmental exposure.  She denies any persistent environmental allergies locally.  She notes significant itching all over body for the last 2 weeks.  Started around her neck as it always does and progressed down to chest arms torso back and some to legs now.  She was traveling in the [place] and then after that and [place].  She states she was seen by a physician at her resort in the [place] when she was given an injection of a steroid and put on a Medrol Dosepak.  She reports partial improvement but no complete resolution similar to 2 years ago.  She notes at that time she needed a second course of higher dose prednisone and inquires that I look in the chart for the dose that she received in 2017; she requests that today.  Additionally she requests referral to allergist.  We discussed the nature of steroids, steroid dosing, steroid side effects.  She is a nurse.  She notes 2 years ago when itch and allergies began she was concerned for possible swelling to throat.  This time she denies any respiratory oropharynx or throat involvement.  She denies cough stridor wheeze or sensation of swelling to throat.    Review of Systems   Constitutional: Negative for chills and fever.   HENT: Negative for ear pain and sore throat.    Respiratory: Negative for cough, sputum production, shortness of breath, wheezing and stridor.    Gastrointestinal: Negative for abdominal pain, nausea and vomiting.  "  Skin: Positive for itching and rash.   Neurological: Negative for tingling and sensory change.   Endo/Heme/Allergies: Negative for environmental allergies ( pt denies).     Allergies   Allergen Reactions   • Norco [Hydrocodone-Acetaminophen] Itching   • Bactrim [Sulfamethoxazole W-Trimethoprim]    • Doxycycline    • Methocarbamol    • Nsaids       Objective:   /70   Pulse 88   Temp 36.8 °C (98.2 °F) (Temporal)   Ht 1.702 m (5' 7\")   Wt 84.4 kg (186 lb)   SpO2 98%   BMI 29.13 kg/m²   Physical Exam   Constitutional: She is oriented to person, place, and time. She appears well-developed and well-nourished. No distress.   HENT:   Head: Normocephalic and atraumatic.   Right Ear: External ear normal.   Left Ear: External ear normal.   Nose: Nose normal.   Mouth/Throat: Uvula is midline, oropharynx is clear and moist and mucous membranes are normal. No uvula swelling. No posterior oropharyngeal edema, posterior oropharyngeal erythema or tonsillar abscesses. No tonsillar exudate.   Eyes: Conjunctivae and lids are normal. Right eye exhibits no discharge. Left eye exhibits no discharge. No scleral icterus.   Neck: Neck supple.   Pulmonary/Chest: Effort normal. No respiratory distress.   Musculoskeletal: Normal range of motion.   Neurological: She is alert and oriented to person, place, and time. She is not disoriented.   Skin: Skin is warm and dry. No lesion and no rash noted. She is not diaphoretic. No erythema. No pallor.   Psychiatric: Her speech is normal and behavior is normal.   Nursing note and vitals reviewed.  solumedrol 125mg - tolerates well      Assessment/Plan:   1. Hives  - predniSONE (DELTASONE) 20 MG Tab; 1 tab TID x 3 days then 1 tab BID x 4 days then 1 tab QD x 4 days  Dispense: 21 Tab; Refill: 0  - REFERRAL TO ALLERGY  - methylPREDNISolone sod succ (SOLU-MEDROL) 125 MG injection 125 mg; 2 mL by Intramuscular route Once.  - triamcinolone acetonide (KENALOG) 0.1 % Cream; Apply to affected area " twice daily  Dispense: 1 Tube; Refill: 0    Other orders  - diphenhydrAMINE HCl (BENADRYL ALLERGY PO); Take  by mouth.  Supportive care is reviewed with patient/caregiver - recommend to push PO fluids and electrolytes, Cautioned regarding potential side effects of steroid, avoid nsaids while using  Cautioned for duration of steroid use, we discuss pros cons and potential se's; pt requests, Cautioned regarding potential side effects of steroid, avoid nsaids while using    Limit topical use of triamcinolone to once daily over face axilla or groin    F/u w/ allergist  Differential diagnosis, natural history, supportive care, and indications for immediate follow-up discussed.

## 2019-07-22 ENCOUNTER — APPOINTMENT (RX ONLY)
Dept: URBAN - METROPOLITAN AREA CLINIC 4 | Facility: CLINIC | Age: 44
Setting detail: DERMATOLOGY
End: 2019-07-22

## 2019-07-22 DIAGNOSIS — L81.0 POSTINFLAMMATORY HYPERPIGMENTATION: ICD-10-CM

## 2019-07-22 DIAGNOSIS — L30.9 DERMATITIS, UNSPECIFIED: ICD-10-CM

## 2019-07-22 PROBLEM — F41.9 ANXIETY DISORDER, UNSPECIFIED: Status: ACTIVE | Noted: 2019-07-22

## 2019-07-22 PROBLEM — J45.909 UNSPECIFIED ASTHMA, UNCOMPLICATED: Status: ACTIVE | Noted: 2019-07-22

## 2019-07-22 PROBLEM — F32.9 MAJOR DEPRESSIVE DISORDER, SINGLE EPISODE, UNSPECIFIED: Status: ACTIVE | Noted: 2019-07-22

## 2019-07-22 PROCEDURE — ? DIAGNOSIS COMMENT

## 2019-07-22 PROCEDURE — 99213 OFFICE O/P EST LOW 20 MIN: CPT

## 2019-07-22 PROCEDURE — ? COUNSELING: TOPICAL STEROIDS

## 2019-07-22 PROCEDURE — ? COUNSELING

## 2019-07-22 PROCEDURE — ? ADDITIONAL NOTES

## 2019-07-22 ASSESSMENT — LOCATION DETAILED DESCRIPTION DERM
LOCATION DETAILED: MID POSTERIOR NECK
LOCATION DETAILED: LEFT ANTERIOR DISTAL UPPER ARM
LOCATION DETAILED: LEFT MEDIAL SUPERIOR CHEST
LOCATION DETAILED: MIDDLE STERNUM
LOCATION DETAILED: EPIGASTRIC SKIN
LOCATION DETAILED: LEFT ANTERIOR PROXIMAL UPPER ARM
LOCATION DETAILED: RIGHT ANTERIOR DISTAL UPPER ARM

## 2019-07-22 ASSESSMENT — LOCATION ZONE DERM
LOCATION ZONE: ARM
LOCATION ZONE: TRUNK
LOCATION ZONE: NECK

## 2019-07-22 ASSESSMENT — LOCATION SIMPLE DESCRIPTION DERM
LOCATION SIMPLE: POSTERIOR NECK
LOCATION SIMPLE: RIGHT UPPER ARM
LOCATION SIMPLE: LEFT UPPER ARM
LOCATION SIMPLE: CHEST
LOCATION SIMPLE: ABDOMEN

## 2019-07-22 NOTE — HPI: RASH
How Severe Is Your Rash?: mild
Is This A New Presentation, Or A Follow-Up?: Rash
Additional History: Patient first saw a doctor in the [place] when on vacation . She was given IV steroids which calmed the itching. When she returned to the [place] she went to a [facility] where she was given a Prednisone taper along with Triamcinolone cream. Patient states the bumps have subsided since finishing the taper.

## 2019-07-22 NOTE — PROCEDURE: ADDITIONAL NOTES
Detail Level: Zone
Additional Notes: Patient advised to use and keep fragrance free and dye free protocol. \\nPatient advised yo keep a symptom log. \\nPatient advised to use TAC BID twice weekly for maintenance. \\nPatient advised to use emollients like CeraVe or Cetaphil cream. \\nPatient advised to avoid scratching. \\nPatient advised to keep nails short. \\nPatient advised to contact our office if rash recurs despite treatment.\\nDiscussed option of patch testing at a later date if rash recurs frequently.

## 2019-12-07 ENCOUNTER — OFFICE VISIT (OUTPATIENT)
Dept: URGENT CARE | Facility: PHYSICIAN GROUP | Age: 44
End: 2019-12-07
Payer: COMMERCIAL

## 2019-12-07 VITALS
HEIGHT: 67 IN | DIASTOLIC BLOOD PRESSURE: 80 MMHG | BODY MASS INDEX: 28.88 KG/M2 | HEART RATE: 84 BPM | OXYGEN SATURATION: 97 % | RESPIRATION RATE: 14 BRPM | WEIGHT: 184 LBS | TEMPERATURE: 98.1 F | SYSTOLIC BLOOD PRESSURE: 120 MMHG

## 2019-12-07 DIAGNOSIS — N63.15 BREAST LUMP ON RIGHT SIDE AT 12 O'CLOCK POSITION: ICD-10-CM

## 2019-12-07 PROCEDURE — 99214 OFFICE O/P EST MOD 30 MIN: CPT | Performed by: PHYSICIAN ASSISTANT

## 2019-12-07 ASSESSMENT — ENCOUNTER SYMPTOMS
SORE THROAT: 0
JOINT SWELLING: 0
ANOREXIA: 0
HEADACHES: 0
BREAST MASS: 1
SWOLLEN GLANDS: 0
ABDOMINAL PAIN: 0

## 2019-12-07 NOTE — PROGRESS NOTES
"Subjective:      Shaunna Hines is a 44 y.o. female who presents with Breast Mass (x 4 days, slight aching pain, R breast )            Breast Mass   This is a new problem. The current episode started in the past 7 days. The problem occurs constantly. The problem has been unchanged. Pertinent negatives include no abdominal pain, anorexia, headaches, joint swelling, rash, sore throat or swollen glands. Nothing aggravates the symptoms. She has tried nothing for the symptoms. The treatment provided no relief.   Normal screening mammogram in February 2019.  Grandmother has history of breast cancer.  But her mom and sisters have never had breast cancer.  Patient has fibrocystic breasts but has never had other issues      Review of Systems   HENT: Negative for sore throat.    Gastrointestinal: Negative for abdominal pain and anorexia.   Musculoskeletal: Negative for joint swelling.   Skin: Negative for rash.   Neurological: Negative for headaches.          Objective:     /80   Pulse 84   Temp 36.7 °C (98.1 °F) (Temporal)   Resp 14   Ht 1.702 m (5' 7\")   Wt 83.5 kg (184 lb)   SpO2 97%   BMI 28.82 kg/m²      Physical Exam  Constitutional:       Appearance: Normal appearance.   HENT:      Head: Normocephalic and atraumatic.      Right Ear: Tympanic membrane normal.      Left Ear: Tympanic membrane normal.      Mouth/Throat:      Mouth: Mucous membranes are moist.   Eyes:      Pupils: Pupils are equal, round, and reactive to light.   Neck:      Musculoskeletal: Normal range of motion.   Cardiovascular:      Rate and Rhythm: Normal rate and regular rhythm.   Pulmonary:      Effort: Pulmonary effort is normal.   Chest:          Comments: Breast exam: There is no asymmetry between the breasts, no changes in skin colors, no nipple inverting.  No discharge of the nipples.  Patient has a cordlike palpable mass that is fixed and nonmobile between 12 and 2 o'clock position in the right breast superior to the " areola  Abdominal:      General: Abdomen is flat.   Musculoskeletal: Normal range of motion.   Skin:     Capillary Refill: Capillary refill takes less than 2 seconds.   Neurological:      General: No focal deficit present.      Mental Status: She is oriented to person, place, and time.   Psychiatric:         Mood and Affect: Mood normal.                 Assessment/Plan:       1. Breast lump on right side at 12 o'clock position  Please follow-up with primary care.  I did put in for diagnostic mammogram and ultrasound.  - MA DIAGNOSTIC MAMMO LEFT W/CAD; Future

## 2019-12-12 ENCOUNTER — TELEPHONE (OUTPATIENT)
Dept: RADIOLOGY | Facility: MEDICAL CENTER | Age: 44
End: 2019-12-12

## 2019-12-18 ENCOUNTER — TELEPHONE (OUTPATIENT)
Dept: OBGYN | Facility: CLINIC | Age: 44
End: 2019-12-18

## 2019-12-18 ENCOUNTER — TELEPHONE (OUTPATIENT)
Dept: RADIOLOGY | Facility: MEDICAL CENTER | Age: 44
End: 2019-12-18

## 2019-12-19 ENCOUNTER — APPOINTMENT (OUTPATIENT)
Dept: RADIOLOGY | Facility: MEDICAL CENTER | Age: 44
End: 2019-12-19
Attending: PHYSICIAN ASSISTANT
Payer: COMMERCIAL

## 2019-12-19 DIAGNOSIS — Z12.39 SCREENING FOR BREAST CANCER: ICD-10-CM

## 2019-12-19 NOTE — TELEPHONE ENCOUNTER
Ameena clayton [facility] LM on VM requesting Dr. Cadena to place order for Diagnostic Mammogram with US. Message sent to Dr. Cadena  Received orders via fax. Dr. Youngblood signed orders and [place] [facility] notified

## 2019-12-20 ENCOUNTER — TELEPHONE (OUTPATIENT)
Dept: RADIOLOGY | Facility: MEDICAL CENTER | Age: 44
End: 2019-12-20

## 2020-01-06 ENCOUNTER — HOSPITAL ENCOUNTER (OUTPATIENT)
Dept: RADIOLOGY | Facility: MEDICAL CENTER | Age: 45
End: 2020-01-06
Attending: OBSTETRICS & GYNECOLOGY
Payer: COMMERCIAL

## 2020-01-06 DIAGNOSIS — N63.0 BREAST LUMP: ICD-10-CM

## 2020-01-06 PROCEDURE — G0279 TOMOSYNTHESIS, MAMMO: HCPCS

## 2020-01-06 PROCEDURE — 76642 ULTRASOUND BREAST LIMITED: CPT | Mod: RT

## 2020-01-20 ENCOUNTER — APPOINTMENT (RX ONLY)
Dept: URBAN - METROPOLITAN AREA CLINIC 4 | Facility: CLINIC | Age: 45
Setting detail: DERMATOLOGY
End: 2020-01-20

## 2020-01-20 DIAGNOSIS — D22 MELANOCYTIC NEVI: ICD-10-CM

## 2020-01-20 DIAGNOSIS — L81.0 POSTINFLAMMATORY HYPERPIGMENTATION: ICD-10-CM

## 2020-01-20 DIAGNOSIS — L30.9 DERMATITIS, UNSPECIFIED: ICD-10-CM

## 2020-01-20 DIAGNOSIS — Z71.89 OTHER SPECIFIED COUNSELING: ICD-10-CM

## 2020-01-20 DIAGNOSIS — L90.5 SCAR CONDITIONS AND FIBROSIS OF SKIN: ICD-10-CM

## 2020-01-20 PROBLEM — D22.5 MELANOCYTIC NEVI OF TRUNK: Status: ACTIVE | Noted: 2020-01-20

## 2020-01-20 PROBLEM — D22.72 MELANOCYTIC NEVI OF LEFT LOWER LIMB, INCLUDING HIP: Status: ACTIVE | Noted: 2020-01-20

## 2020-01-20 PROCEDURE — ? ADDITIONAL NOTES

## 2020-01-20 PROCEDURE — 99214 OFFICE O/P EST MOD 30 MIN: CPT

## 2020-01-20 PROCEDURE — ? COUNSELING: TOPICAL STEROIDS

## 2020-01-20 PROCEDURE — ? COUNSELING

## 2020-01-20 ASSESSMENT — LOCATION DETAILED DESCRIPTION DERM
LOCATION DETAILED: MID POSTERIOR NECK
LOCATION DETAILED: RIGHT ANTERIOR PROXIMAL UPPER ARM
LOCATION DETAILED: EPIGASTRIC SKIN
LOCATION DETAILED: SUPERIOR THORACIC SPINE
LOCATION DETAILED: LEFT ANTERIOR PROXIMAL UPPER ARM
LOCATION DETAILED: LEFT MEDIAL PLANTAR MIDFOOT
LOCATION DETAILED: UPPER STERNUM
LOCATION DETAILED: MID TRAPEZIAL NECK
LOCATION DETAILED: MIDDLE STERNUM
LOCATION DETAILED: RIGHT ANTERIOR DISTAL THIGH
LOCATION DETAILED: RIGHT ANTERIOR DISTAL UPPER ARM

## 2020-01-20 ASSESSMENT — LOCATION ZONE DERM
LOCATION ZONE: ARM
LOCATION ZONE: FEET
LOCATION ZONE: TRUNK
LOCATION ZONE: NECK
LOCATION ZONE: LEG

## 2020-01-20 ASSESSMENT — LOCATION SIMPLE DESCRIPTION DERM
LOCATION SIMPLE: UPPER BACK
LOCATION SIMPLE: ABDOMEN
LOCATION SIMPLE: RIGHT UPPER ARM
LOCATION SIMPLE: LEFT UPPER ARM
LOCATION SIMPLE: POSTERIOR NECK
LOCATION SIMPLE: RIGHT THIGH
LOCATION SIMPLE: LEFT PLANTAR SURFACE
LOCATION SIMPLE: TRAPEZIAL NECK
LOCATION SIMPLE: CHEST

## 2020-01-20 NOTE — PROCEDURE: MIPS QUALITY
Quality 130: Documentation Of Current Medications In The Medical Record: Current Medications Documented
Detail Level: Generalized
Quality 402: Tobacco Use And Help With Quitting Among Adolescents: Patient screened for tobacco and never smoked
Quality 110: Preventive Care And Screening: Influenza Immunization: Influenza Immunization Administered during Influenza season

## 2020-01-20 NOTE — PROCEDURE: ADDITIONAL NOTES
Detail Level: Zone
Additional Notes: Recommend patient use emollients like CeraVe or Cetaphil cream daily. \\nPatient advised to contact our office if rash flares.

## 2020-02-07 ENCOUNTER — OFFICE VISIT (OUTPATIENT)
Dept: URGENT CARE | Facility: PHYSICIAN GROUP | Age: 45
End: 2020-02-07
Payer: COMMERCIAL

## 2020-02-07 VITALS
TEMPERATURE: 99 F | OXYGEN SATURATION: 100 % | WEIGHT: 186 LBS | HEART RATE: 80 BPM | SYSTOLIC BLOOD PRESSURE: 120 MMHG | DIASTOLIC BLOOD PRESSURE: 90 MMHG | BODY MASS INDEX: 29.19 KG/M2 | RESPIRATION RATE: 16 BRPM | HEIGHT: 67 IN

## 2020-02-07 DIAGNOSIS — M54.50 ACUTE LOW BACK PAIN WITHOUT SCIATICA, UNSPECIFIED BACK PAIN LATERALITY: ICD-10-CM

## 2020-02-07 PROBLEM — E05.90 THYROTOXICOSIS: Status: ACTIVE | Noted: 2020-02-07

## 2020-02-07 PROCEDURE — 99214 OFFICE O/P EST MOD 30 MIN: CPT | Performed by: NURSE PRACTITIONER

## 2020-02-07 RX ORDER — METHYLPREDNISOLONE 4 MG/1
TABLET ORAL
Qty: 21 TAB | Refills: 0 | Status: SHIPPED | OUTPATIENT
Start: 2020-02-07

## 2020-02-07 RX ORDER — ALBUTEROL SULFATE 90 UG/1
AEROSOL, METERED RESPIRATORY (INHALATION)
COMMUNITY

## 2020-02-07 RX ORDER — ACETAMINOPHEN 500 MG
500-1000 TABLET ORAL EVERY 6 HOURS PRN
COMMUNITY
End: 2020-02-07

## 2020-02-07 RX ORDER — LIDOCAINE 50 MG/G
1 PATCH TOPICAL EVERY 24 HOURS
Qty: 10 PATCH | Refills: 0 | Status: SHIPPED | OUTPATIENT
Start: 2020-02-07

## 2020-02-07 RX ORDER — CYCLOBENZAPRINE HCL 5 MG
5-10 TABLET ORAL 3 TIMES DAILY PRN
Qty: 30 TAB | Refills: 0 | Status: SHIPPED | OUTPATIENT
Start: 2020-02-07 | End: 2020-02-07

## 2020-02-07 SDOH — HEALTH STABILITY: MENTAL HEALTH: HOW OFTEN DO YOU HAVE A DRINK CONTAINING ALCOHOL?: MONTHLY OR LESS

## 2020-02-07 ASSESSMENT — ENCOUNTER SYMPTOMS
HEADACHES: 0
FEVER: 0
CHILLS: 0
TINGLING: 0
BACK PAIN: 1
DIZZINESS: 0
SENSORY CHANGE: 0
VOMITING: 0

## 2020-02-07 ASSESSMENT — PAIN SCALES - GENERAL: PAINLEVEL: 4=SLIGHT-MODERATE PAIN

## 2020-02-07 ASSESSMENT — LIFESTYLE VARIABLES: SUBSTANCE_ABUSE: 0

## 2020-02-07 NOTE — PROGRESS NOTES
"Subjective:      Shaunna Hines is a 44 y.o. female who presents with Back Pain (pain goes down R leg, R knee weakness, x2 months )    Reviewed past medical, surgical and family history. Reviewed prescription and OTC medications with patient in electronic health record today      Allergies   Allergen Reactions   • Norco [Hydrocodone-Acetaminophen] Itching   • Bactrim [Sulfamethoxazole W-Trimethoprim]    • Doxycycline    • Methocarbamol    • Nsaids              Back Pain   This is a new problem. Episode onset:  2 months. Pertinent negatives include no dysuria, fever, headaches or tingling. (Laminectomy in Sept. 2018. Dr. Coleman  Standing makes the pain a little better  Pain radiates to right leg and feels like her right leg is going to give out on her. ) She has tried ice for the symptoms. The treatment provided mild relief.   No other aggravating or alleviating factors.       Review of Systems   Constitutional: Negative for chills and fever.   Gastrointestinal: Negative for vomiting.   Genitourinary: Negative for dysuria, frequency and urgency.   Musculoskeletal: Positive for back pain.   Neurological: Negative for dizziness, tingling, sensory change and headaches.   Endo/Heme/Allergies: Negative for environmental allergies.   Psychiatric/Behavioral: Negative for substance abuse.          Objective:     /90   Pulse 80   Temp 37.2 °C (99 °F) (Temporal)   Resp 16   Ht 1.702 m (5' 7\")   Wt 84.4 kg (186 lb)   SpO2 100%   BMI 29.13 kg/m²      Physical Exam  Vitals signs and nursing note reviewed.   Constitutional:       General: She is not in acute distress.     Appearance: Normal appearance. She is well-developed and well-groomed. She is not toxic-appearing.   HENT:      Head: Normocephalic.   Cardiovascular:      Rate and Rhythm: Normal rate and regular rhythm.      Pulses: Normal pulses.   Pulmonary:      Effort: Pulmonary effort is normal.      Breath sounds: Normal breath sounds.   Musculoskeletal:   "   Lumbar back: She exhibits decreased range of motion, tenderness and pain. She exhibits no bony tenderness, no swelling, no edema, no deformity and no spasm.        Back:       Comments: Neg SLR, Flexion to 45 *, extension to 15*, full rotation without difficulty. Gait is slow and stiff. Slight flattening of lumbar spinal curve. Stands erect.    Skin:     General: Skin is warm and dry.      Capillary Refill: Capillary refill takes less than 2 seconds.      Findings: No rash.   Neurological:      Mental Status: She is alert and oriented to person, place, and time.      Deep Tendon Reflexes:      Reflex Scores:       Patellar reflexes are 2+ on the right side and 2+ on the left side.  Psychiatric:         Attention and Perception: Attention normal.         Mood and Affect: Mood and affect normal.         Speech: Speech normal.         Behavior: Behavior normal. Behavior is cooperative.         Thought Content: Thought content normal.                 Assessment/Plan:       1. Acute low back pain without sciatica, unspecified back pain laterality  lidocaine (LIDODERM) 5 % Patch    cyclobenzaprine (FLEXERIL) 5 MG tablet    methylPREDNISolone (MEDROL DOSEPAK) 4 MG Tablet Therapy Pack         Educated in proper administration of medication(s) ordered today including safety, possible SE, risks, benefits, rationale and alternatives to therapy.     OTC acetaminophen for breakthrough pain. Dosage and directions per . Do not exceed 3000 mg in 24 hours.     Recommend FU with Neurosurgeon next week.     Return to urgent care clinic or PCP 5-7  days if current symptoms are not resolving in a satisfactory manner or sooner if new or worsening symptoms occur. Differential diagnosis, natural history, supportive care, and indications for immediate follow-up discussed at length.   Advised of signs and symptoms which would warrant further evaluation and /or emergent evaluation in ER.    Verbalized agreement with this  treatment plan and seemed to understand without barriers. Questions were encouraged and answered to satisfaction.

## 2020-10-12 ENCOUNTER — TELEPHONE (OUTPATIENT)
Dept: OBGYN | Facility: CLINIC | Age: 45
End: 2020-10-12

## 2020-10-12 NOTE — TELEPHONE ENCOUNTER
Pt called wondering If we could pend another mammo for she can have done by the time her annual comes around. Spoke with Dr Dietz and she recommends waiting until her annual exam to get it pended. Pt understood and had no further questions.

## 2020-11-10 ENCOUNTER — OFFICE VISIT (OUTPATIENT)
Dept: URGENT CARE | Facility: PHYSICIAN GROUP | Age: 45
End: 2020-11-10
Payer: COMMERCIAL

## 2020-11-10 VITALS
DIASTOLIC BLOOD PRESSURE: 88 MMHG | WEIGHT: 188 LBS | BODY MASS INDEX: 29.51 KG/M2 | RESPIRATION RATE: 14 BRPM | TEMPERATURE: 98.4 F | SYSTOLIC BLOOD PRESSURE: 142 MMHG | OXYGEN SATURATION: 98 % | HEART RATE: 94 BPM | HEIGHT: 67 IN

## 2020-11-10 DIAGNOSIS — M62.830 SPASM OF THORACIC BACK MUSCLE: ICD-10-CM

## 2020-11-10 PROCEDURE — 99214 OFFICE O/P EST MOD 30 MIN: CPT | Performed by: PHYSICIAN ASSISTANT

## 2020-11-10 RX ORDER — KETOROLAC TROMETHAMINE 30 MG/ML
30 INJECTION, SOLUTION INTRAMUSCULAR; INTRAVENOUS ONCE
Status: COMPLETED | OUTPATIENT
Start: 2020-11-10 | End: 2020-11-10

## 2020-11-10 RX ORDER — AMLODIPINE BESYLATE 5 MG/1
5 TABLET ORAL
COMMUNITY
Start: 2020-10-26

## 2020-11-10 RX ADMIN — KETOROLAC TROMETHAMINE 30 MG: 30 INJECTION, SOLUTION INTRAMUSCULAR; INTRAVENOUS at 15:01

## 2020-11-10 ASSESSMENT — ENCOUNTER SYMPTOMS
BLURRED VISION: 0
ABDOMINAL PAIN: 0
NAUSEA: 0
BACK PAIN: 1
WHEEZING: 0
VOMITING: 0
FOCAL WEAKNESS: 0
SENSORY CHANGE: 0
SPUTUM PRODUCTION: 0
CHILLS: 0
FEVER: 0
SHORTNESS OF BREATH: 0
TINGLING: 0
PALPITATIONS: 0
COUGH: 0

## 2020-11-10 NOTE — PROGRESS NOTES
"Subjective:   Shaunna Hines  is a 45 y.o. female who presents for Shoulder Pain (L shoulder pain radiates up [id number] )      Shoulder Pain  Pertinent negatives include no abdominal pain, chest pain, chills, coughing, fever, nausea, rash or vomiting.   Patient comes clinic complaining of spastic pain to left posterior shoulder and back that occurred last night.  She notes pain in the night last night to left inferior medial border of scapula and back.  She describes a spastic pain with some radiation up to left side of neck.  She was recently started on amlodipine 5 mg +2 weeks ago following evaluation by primary care.  She did have blood work checked by her primary care at that time as well that \"all normal\" (primary care physician is outside of [facility] network).  Patient reports becoming slightly scared thinking about recent diagnosis of hypertension and new onset of spastic left-sided back pain and came to the urgent care to \"check her heart\".  She denies any personal history of cardiac abnormalities.  She denies any history of abnormal cholesterol.  She denies any cardiac events historically.  She denies exertional pain.  She denies shortness of breath cough or palpitations.  She denies nausea vomiting or abdominal pain.  She denies numbness tingling or weakness.  She denies recent change in activity.  She did try some massage and taking a Flexeril which she had at home with no significant resolution of pain.  She denies recent trauma.    Review of Systems   Constitutional: Negative for chills and fever.   HENT: Negative for tinnitus.    Eyes: Negative for blurred vision.   Respiratory: Negative for cough, sputum production, shortness of breath and wheezing.    Cardiovascular: Negative for chest pain, palpitations and leg swelling.   Gastrointestinal: Negative for abdominal pain, nausea and vomiting.   Musculoskeletal: Positive for back pain and joint pain ( Posterior left shoulder).   Skin: Negative for " "rash.   Neurological: Negative for tingling, sensory change and focal weakness.       Allergies   Allergen Reactions   • Norco [Hydrocodone-Acetaminophen] Itching   • Bactrim [Sulfamethoxazole W-Trimethoprim]    • Doxycycline    • Methocarbamol    • Nsaids         Objective:   /88   Pulse 94   Temp 36.9 °C (98.4 °F) (Temporal)   Resp 14   Ht 1.702 m (5' 7\")   Wt 85.3 kg (188 lb)   SpO2 98%   BMI 29.44 kg/m²     Physical Exam  Vitals signs and nursing note reviewed.   Constitutional:       General: She is not in acute distress.     Appearance: She is well-developed. She is not diaphoretic.   HENT:      Head: Normocephalic and atraumatic.      Right Ear: External ear normal.      Left Ear: External ear normal.      Nose: Nose normal.   Eyes:      General: Lids are normal. No scleral icterus.        Right eye: No discharge.         Left eye: No discharge.      Conjunctiva/sclera: Conjunctivae normal.   Neck:      Musculoskeletal: Neck supple.   Cardiovascular:      Rate and Rhythm: Normal rate and regular rhythm.  No extrasystoles are present.  Pulmonary:      Effort: Pulmonary effort is normal. No accessory muscle usage or respiratory distress.      Breath sounds: Normal breath sounds and air entry. No decreased breath sounds, wheezing, rhonchi or rales.   Musculoskeletal: Normal range of motion.      Thoracic back: She exhibits tenderness and pain. She exhibits normal range of motion, no bony tenderness, no swelling, no edema, no deformity, no laceration, no spasm and normal pulse.        Back:       Right lower leg: No edema.      Left lower leg: No edema.   Skin:     General: Skin is warm and dry.      Coloration: Skin is not pale.      Findings: No erythema.   Neurological:      Mental Status: She is alert and oriented to person, place, and time. She is not disoriented.   Psychiatric:         Speech: Speech normal.         Behavior: Behavior normal.     EKG in the office reveals a normal sinus rhythm " with a rate of 72. There is no ectopy, no ST elevation, depression, LVH.      Assessment/Plan:   1. Spasm of thoracic back muscle  - ketorolac (TORADOL) injection 30 mg  - EKG    Other orders  - amLODIPine (NORVASC) 5 MG Tab; Take 5 mg by mouth every day.  Recommend conservative care, rest, ice, heat, work on gentle ROM exercises, IM Toradol in clinic, Rx Flexeril and OTC Tylenol-patient declines refills of these medications, we review ER evaluation with any worsened or persistent symptoms despite treatment  Return to clinic with lack of resolution or progression of symptoms.  Cautioned regarding potential for sedation with medication.  ER precautions with any worsening symptoms are reviewed with patient/caregiver and they do express understanding    I have worn an N95 mask, gloves and eye protection for the entire encounter with this patient.     Differential diagnosis, natural history, supportive care, and indications for immediate follow-up discussed.

## 2021-01-18 ENCOUNTER — HOSPITAL ENCOUNTER (OUTPATIENT)
Dept: RADIOLOGY | Facility: MEDICAL CENTER | Age: 46
End: 2021-01-18
Attending: OBSTETRICS & GYNECOLOGY
Payer: COMMERCIAL

## 2021-01-18 DIAGNOSIS — Z12.31 VISIT FOR SCREENING MAMMOGRAM: ICD-10-CM

## 2021-01-18 PROCEDURE — 77063 BREAST TOMOSYNTHESIS BI: CPT

## 2021-01-19 ENCOUNTER — APPOINTMENT (RX ONLY)
Dept: URBAN - METROPOLITAN AREA CLINIC 4 | Facility: CLINIC | Age: 46
Setting detail: DERMATOLOGY
End: 2021-01-19

## 2021-01-19 DIAGNOSIS — D22 MELANOCYTIC NEVI: ICD-10-CM

## 2021-01-19 DIAGNOSIS — L90.5 SCAR CONDITIONS AND FIBROSIS OF SKIN: ICD-10-CM

## 2021-01-19 DIAGNOSIS — Z71.89 OTHER SPECIFIED COUNSELING: ICD-10-CM

## 2021-01-19 PROBLEM — D22.5 MELANOCYTIC NEVI OF TRUNK: Status: ACTIVE | Noted: 2021-01-19

## 2021-01-19 PROBLEM — D23.5 OTHER BENIGN NEOPLASM OF SKIN OF TRUNK: Status: ACTIVE | Noted: 2021-01-19

## 2021-01-19 PROBLEM — D22.72 MELANOCYTIC NEVI OF LEFT LOWER LIMB, INCLUDING HIP: Status: ACTIVE | Noted: 2021-01-19

## 2021-01-19 PROBLEM — D22.61 MELANOCYTIC NEVI OF RIGHT UPPER LIMB, INCLUDING SHOULDER: Status: ACTIVE | Noted: 2021-01-19

## 2021-01-19 PROBLEM — D22.62 MELANOCYTIC NEVI OF LEFT UPPER LIMB, INCLUDING SHOULDER: Status: ACTIVE | Noted: 2021-01-19

## 2021-01-19 PROCEDURE — 99213 OFFICE O/P EST LOW 20 MIN: CPT

## 2021-01-19 PROCEDURE — ? COUNSELING

## 2021-01-19 PROCEDURE — ? SUNSCREEN TREATMENT REGIMEN

## 2021-01-19 ASSESSMENT — LOCATION SIMPLE DESCRIPTION DERM
LOCATION SIMPLE: RIGHT UPPER ARM
LOCATION SIMPLE: LEFT PLANTAR SURFACE
LOCATION SIMPLE: RIGHT THIGH
LOCATION SIMPLE: LEFT UPPER ARM
LOCATION SIMPLE: CHEST
LOCATION SIMPLE: UPPER BACK

## 2021-01-19 ASSESSMENT — LOCATION DETAILED DESCRIPTION DERM
LOCATION DETAILED: LEFT ANTERIOR DISTAL UPPER ARM
LOCATION DETAILED: UPPER STERNUM
LOCATION DETAILED: RIGHT ANTERIOR DISTAL THIGH
LOCATION DETAILED: RIGHT ANTERIOR DISTAL UPPER ARM
LOCATION DETAILED: SUPERIOR THORACIC SPINE
LOCATION DETAILED: LEFT MEDIAL PLANTAR MIDFOOT
LOCATION DETAILED: MIDDLE STERNUM

## 2021-01-19 ASSESSMENT — LOCATION ZONE DERM
LOCATION ZONE: LEG
LOCATION ZONE: TRUNK
LOCATION ZONE: FEET
LOCATION ZONE: ARM

## 2021-03-05 RX ADMIN — TRIAMCINOLONE ACETONIDE: 1 OINTMENT TOPICAL at 00:00

## 2021-03-06 ENCOUNTER — APPOINTMENT (RX ONLY)
Dept: URBAN - METROPOLITAN AREA CLINIC 4 | Facility: CLINIC | Age: 46
Setting detail: DERMATOLOGY
End: 2021-03-06

## 2021-03-06 DIAGNOSIS — L259 CONTACT DERMATITIS AND OTHER ECZEMA, UNSPECIFIED CAUSE: ICD-10-CM | Status: WORSENING

## 2021-03-06 PROBLEM — L30.8 OTHER SPECIFIED DERMATITIS: Status: ACTIVE | Noted: 2021-03-06

## 2021-03-06 PROCEDURE — ? DIAGNOSIS COMMENT

## 2021-03-06 PROCEDURE — ? PRESCRIPTION

## 2021-03-06 PROCEDURE — ? COUNSELING

## 2021-03-06 PROCEDURE — 99214 OFFICE O/P EST MOD 30 MIN: CPT

## 2021-03-06 RX ORDER — TRIAMCINOLONE ACETONIDE 1 MG/G
OINTMENT TOPICAL
Qty: 1 | Refills: 2 | Status: ERX | COMMUNITY
Start: 2021-03-05

## 2021-03-06 ASSESSMENT — LOCATION DETAILED DESCRIPTION DERM
LOCATION DETAILED: LEFT INFERIOR ANTERIOR NECK
LOCATION DETAILED: RIGHT SUPERIOR MEDIAL UPPER BACK
LOCATION DETAILED: EPIGASTRIC SKIN
LOCATION DETAILED: LEFT PROXIMAL POSTERIOR UPPER ARM
LOCATION DETAILED: RIGHT POPLITEAL SKIN
LOCATION DETAILED: RIGHT PROXIMAL POSTERIOR UPPER ARM
LOCATION DETAILED: RIGHT ANTERIOR DISTAL UPPER ARM
LOCATION DETAILED: LEFT MEDIAL SUPERIOR CHEST
LOCATION DETAILED: LEFT POPLITEAL SKIN
LOCATION DETAILED: LEFT ANTERIOR PROXIMAL UPPER ARM

## 2021-03-06 ASSESSMENT — LOCATION SIMPLE DESCRIPTION DERM
LOCATION SIMPLE: RIGHT UPPER ARM
LOCATION SIMPLE: RIGHT POSTERIOR UPPER ARM
LOCATION SIMPLE: RIGHT POPLITEAL SKIN
LOCATION SIMPLE: LEFT POPLITEAL SKIN
LOCATION SIMPLE: RIGHT UPPER BACK
LOCATION SIMPLE: CHEST
LOCATION SIMPLE: LEFT ANTERIOR NECK
LOCATION SIMPLE: LEFT POSTERIOR UPPER ARM
LOCATION SIMPLE: ABDOMEN
LOCATION SIMPLE: LEFT UPPER ARM

## 2021-03-06 ASSESSMENT — LOCATION ZONE DERM
LOCATION ZONE: TRUNK
LOCATION ZONE: LEG
LOCATION ZONE: NECK
LOCATION ZONE: ARM

## 2021-03-18 ENCOUNTER — GYNECOLOGY VISIT (OUTPATIENT)
Dept: OBGYN | Facility: CLINIC | Age: 46
End: 2021-03-18
Payer: COMMERCIAL

## 2021-03-18 ENCOUNTER — APPOINTMENT (RX ONLY)
Dept: URBAN - METROPOLITAN AREA CLINIC 4 | Facility: CLINIC | Age: 46
Setting detail: DERMATOLOGY
End: 2021-03-18

## 2021-03-18 VITALS — SYSTOLIC BLOOD PRESSURE: 147 MMHG | WEIGHT: 192 LBS | BODY MASS INDEX: 30.07 KG/M2 | DIASTOLIC BLOOD PRESSURE: 98 MMHG

## 2021-03-18 DIAGNOSIS — L259 CONTACT DERMATITIS AND OTHER ECZEMA, UNSPECIFIED CAUSE: ICD-10-CM | Status: IMPROVED

## 2021-03-18 DIAGNOSIS — Z01.419 WELL WOMAN EXAM WITH ROUTINE GYNECOLOGICAL EXAM: ICD-10-CM

## 2021-03-18 PROBLEM — L30.8 OTHER SPECIFIED DERMATITIS: Status: ACTIVE | Noted: 2021-03-18

## 2021-03-18 PROCEDURE — ? ADDITIONAL NOTES

## 2021-03-18 PROCEDURE — ? COUNSELING

## 2021-03-18 PROCEDURE — 99396 PREV VISIT EST AGE 40-64: CPT | Performed by: OBSTETRICS & GYNECOLOGY

## 2021-03-18 PROCEDURE — 99212 OFFICE O/P EST SF 10 MIN: CPT

## 2021-03-18 ASSESSMENT — LOCATION DETAILED DESCRIPTION DERM
LOCATION DETAILED: LEFT PROXIMAL POSTERIOR UPPER ARM
LOCATION DETAILED: RIGHT PROXIMAL POSTERIOR UPPER ARM

## 2021-03-18 ASSESSMENT — LOCATION SIMPLE DESCRIPTION DERM
LOCATION SIMPLE: LEFT POSTERIOR UPPER ARM
LOCATION SIMPLE: RIGHT POSTERIOR UPPER ARM

## 2021-03-18 ASSESSMENT — LOCATION ZONE DERM: LOCATION ZONE: ARM

## 2021-03-18 NOTE — PROGRESS NOTES
Shaunna Hines45 y.o.  female presents for Annual Well Woman Exam.    ROS: Patient is feeling well. No dyspnea or chest pain on exertion. No Abdominal pain, change in bowel habits, black or bloody stools. No urinary sx. GYN ROS:no hot flashes. Denies breast tenderness, mass, discharge, changes in size or contour, or abnormal cyclic discomfort. No neurological complaints.  Past Medical History:   Diagnosis Date   • Allergy    • Anemia    • Migraine      Prior Uterine Surgery   Past Surgical History:   Procedure Laterality Date   • ABDOMINAL HYSTERECTOMY TOTAL      partial   • LUMBAR DISC REPLACEMENT     • NASAL POLYPECTOMY     • PRIMARY C SECTION         Allergy:   Norco [hydrocodone-acetaminophen], Bactrim [sulfamethoxazole w-trimethoprim], Doxycycline, Methocarbamol, and Nsaids    LMP:       No LMP recorded. Patient has had a hysterectomy. .     Menstrual History: s/p hysterectomy  Contraceptive Method:status post hysterectomy      Objective : The patient appears well, alert and oriented x 3, in no acute distress.  /98 (BP Location: Right arm, Patient Position: Sitting)   Wt 87.1 kg (192 lb)   HEENT Exam: EOMI, DELL, no adenopathy or thyromegaly.  Lungs: Clear to Auscultation and Percussion.  Cor: S1 and S2 normal, no murmurs, or rubs   Abdomen: Soft without tenderness, guarding mass or organomegaly.  Extremities: No edema, pulses equal  Neurological: Normal No focal signs  Breast Exam:Inspection negative. No nipple discharge or bleeding. No masses or nodularity palpable, negative findings: normal in size and symmetry, normal contour with no evidence of flattening or dimpling, skin normal, nipples everted without rashes or discharge, palpation negative for masses or nodules  Pelvic: External genitalia,urethral meatus, urethra, bladder and vagina normal. Cervix, uterus and adnexa absent.  Anus and perineum normal. Bimanual and rectovaginal without masses or tenderness.  2  Lab:No results found  for this or any previous visit (from the past 336 hour(s)).    Assessment:  well woman    Plan:mammogram  return annually or prn  Self Breast Exams  Contraception:none

## 2021-03-18 NOTE — PROCEDURE: ADDITIONAL NOTES
Detail Level: Zone
Render Risk Assessment In Note?: no
Additional Notes: Patient advised to use TAC BIW for maintenance. \\nDiscussed option of Dupixent, will defer due to patients improvement. \\nPatient advised to continue soak and smears prn flares.